# Patient Record
Sex: FEMALE | Race: WHITE | Employment: OTHER | URBAN - METROPOLITAN AREA
[De-identification: names, ages, dates, MRNs, and addresses within clinical notes are randomized per-mention and may not be internally consistent; named-entity substitution may affect disease eponyms.]

---

## 2017-01-04 RX ORDER — ESTRADIOL 1 MG/1
1 TABLET ORAL DAILY
Qty: 90 TAB | Refills: 3 | Status: SHIPPED | OUTPATIENT
Start: 2017-01-04 | End: 2017-02-15 | Stop reason: SDUPTHER

## 2017-01-06 RX ORDER — BUPROPION HYDROCHLORIDE 150 MG/1
TABLET, EXTENDED RELEASE ORAL
Qty: 270 TAB | Refills: 3 | Status: SHIPPED | OUTPATIENT
Start: 2017-01-06 | End: 2017-03-09 | Stop reason: ALTCHOICE

## 2017-02-13 RX ORDER — LOVASTATIN 40 MG/1
40 TABLET ORAL
Qty: 90 TAB | Refills: 3 | Status: SHIPPED | OUTPATIENT
Start: 2017-02-13 | End: 2017-03-09 | Stop reason: ALTCHOICE

## 2017-02-13 RX ORDER — ROSUVASTATIN CALCIUM 5 MG/1
5 TABLET, COATED ORAL
Qty: 30 TAB | Refills: 6 | Status: CANCELLED | OUTPATIENT
Start: 2017-02-13

## 2017-02-13 NOTE — TELEPHONE ENCOUNTER
From: Margret Lopez  To: Choco Broderick MD  Sent: 2/10/2017 8:00 PM EST  Subject: Medication Renewal Request    Original authorizing provider: MD Margret Washington would like a refill of the following medications:  rosuvastatin (CRESTOR) 5 mg tablet Choco Broderick MD]    Preferred pharmacy: [address] [place] [address] mail in pharmacy    Comment:  I have been taking atorvastatin for a few months because there was no cost for the rx. The generic Crestor 5 mg. cost the same as the brand name 20 mg. I now need a script for lovastatin 20 or 40 mg. It is the only statin with a 0 copay on my [employer] rx plan. The mail order pharmacy will email a request to you. I would like a 90 day supply. I stopped the generic Wellbutrin as it was too expensive. My next appt with you is March 9th.  Thanks

## 2017-02-16 RX ORDER — ESTRADIOL 1 MG/1
1 TABLET ORAL DAILY
Qty: 90 TAB | Refills: 3 | Status: SHIPPED | OUTPATIENT
Start: 2017-02-16 | End: 2017-03-09 | Stop reason: SDUPTHER

## 2017-02-16 RX ORDER — CYCLOBENZAPRINE HCL 10 MG
10 TABLET ORAL 2 TIMES DAILY
Qty: 180 TAB | Refills: 1 | Status: SHIPPED | OUTPATIENT
Start: 2017-02-16 | End: 2017-03-09 | Stop reason: SDUPTHER

## 2017-02-16 RX ORDER — DICLOFENAC SODIUM 75 MG/1
TABLET, DELAYED RELEASE ORAL
Qty: 180 TAB | Refills: 1 | Status: SHIPPED | OUTPATIENT
Start: 2017-02-16 | End: 2017-03-09 | Stop reason: SDUPTHER

## 2017-03-07 ENCOUNTER — HOSPITAL ENCOUNTER (OUTPATIENT)
Dept: LAB | Age: 72
Discharge: HOME | End: 2017-03-07
Payer: MEDICARE

## 2017-03-07 PROCEDURE — 36415 COLL VENOUS BLD VENIPUNCTURE: CPT

## 2017-03-07 PROCEDURE — 80053 COMPREHEN METABOLIC PANEL: CPT

## 2017-03-07 PROCEDURE — 80061 LIPID PANEL: CPT

## 2017-03-08 DIAGNOSIS — E78.00 PURE HYPERCHOLESTEROLEMIA: ICD-10-CM

## 2017-03-08 LAB
ALBUMIN SERPL-MCNC: 4 G/DL (ref 3.6–4.8)
ALBUMIN/GLOB SERPL: 1.6 {RATIO} (ref 1.1–2.5)
ALP SERPL-CCNC: 65 IU/L (ref 39–117)
ALT SERPL-CCNC: 23 IU/L (ref 0–32)
AST SERPL-CCNC: 29 IU/L (ref 0–40)
BILIRUB SERPL-MCNC: 0.5 MG/DL (ref 0–1.2)
BUN SERPL-MCNC: 14 MG/DL (ref 8–27)
BUN/CREAT SERPL: 20 (ref 11–26)
CALCIUM SERPL-MCNC: 9.5 MG/DL (ref 8.7–10.3)
CHLORIDE SERPL-SCNC: 100 MMOL/L (ref 96–106)
CHOLEST SERPL-MCNC: 227 MG/DL (ref 100–199)
CO2 SERPL-SCNC: 27 MMOL/L (ref 18–29)
CREAT SERPL-MCNC: 0.69 MG/DL (ref 0.57–1)
GLOBULIN SER CALC-MCNC: 2.5 G/DL (ref 1.5–4.5)
GLUCOSE SERPL-MCNC: 85 MG/DL (ref 65–99)
HDLC SERPL-MCNC: 56 MG/DL
INTERPRETATION, 910389: NORMAL
LDLC SERPL CALC-MCNC: 109 MG/DL (ref 0–99)
POTASSIUM SERPL-SCNC: 4.3 MMOL/L (ref 3.5–5.2)
PROT SERPL-MCNC: 6.5 G/DL (ref 6–8.5)
SODIUM SERPL-SCNC: 140 MMOL/L (ref 134–144)
TRIGL SERPL-MCNC: 309 MG/DL (ref 0–149)
VLDLC SERPL CALC-MCNC: 62 MG/DL (ref 5–40)

## 2017-03-09 ENCOUNTER — OFFICE VISIT (OUTPATIENT)
Dept: INTERNAL MEDICINE CLINIC | Age: 72
End: 2017-03-09

## 2017-03-09 VITALS
HEART RATE: 89 BPM | DIASTOLIC BLOOD PRESSURE: 84 MMHG | HEIGHT: 61 IN | OXYGEN SATURATION: 97 % | SYSTOLIC BLOOD PRESSURE: 143 MMHG | TEMPERATURE: 98.4 F | RESPIRATION RATE: 16 BRPM | WEIGHT: 175.6 LBS | BODY MASS INDEX: 33.15 KG/M2

## 2017-03-09 DIAGNOSIS — E78.00 PURE HYPERCHOLESTEROLEMIA: Primary | ICD-10-CM

## 2017-03-09 DIAGNOSIS — Z12.11 SCREEN FOR COLON CANCER: ICD-10-CM

## 2017-03-09 RX ORDER — CYCLOBENZAPRINE HCL 10 MG
10 TABLET ORAL 2 TIMES DAILY
Qty: 180 TAB | Refills: 1 | Status: SHIPPED | OUTPATIENT
Start: 2017-03-09 | End: 2017-09-05 | Stop reason: SDUPTHER

## 2017-03-09 RX ORDER — DICLOFENAC SODIUM 75 MG/1
TABLET, DELAYED RELEASE ORAL
Qty: 180 TAB | Refills: 1 | Status: SHIPPED | OUTPATIENT
Start: 2017-03-09 | End: 2017-09-05 | Stop reason: SDUPTHER

## 2017-03-09 RX ORDER — ALPRAZOLAM 0.5 MG/1
.25-.5 TABLET ORAL
Qty: 90 TAB | Refills: 0 | Status: SHIPPED | OUTPATIENT
Start: 2017-03-09

## 2017-03-09 RX ORDER — ATORVASTATIN CALCIUM 20 MG/1
20 TABLET, FILM COATED ORAL DAILY
Qty: 90 TAB | Refills: 3 | Status: SHIPPED | OUTPATIENT
Start: 2017-03-09 | End: 2017-12-26 | Stop reason: SDUPTHER

## 2017-03-09 RX ORDER — ESTRADIOL 1 MG/1
1 TABLET ORAL DAILY
Qty: 90 TAB | Refills: 3 | Status: SHIPPED | OUTPATIENT
Start: 2017-03-09 | End: 2018-03-14 | Stop reason: SDUPTHER

## 2017-03-09 NOTE — MR AVS SNAPSHOT
Visit Information Date & Time Provider Department Dept. Phone Encounter #  
 3/9/2017 11:20 AM Rosa Corrigan MD [facility] [phone] [id number] Follow-up Instructions Return in about 6 months (around 9/9/2017) for hyperlipidemia. Upcoming Health Maintenance Date Due COLONOSCOPY 11/3/1965 GLAUCOMA SCREENING Q2Y 6/10/2016 MEDICARE YEARLY EXAM 4/5/2017 Pneumococcal 65+ Low/Medium Risk (2 of 2 - PPSV23) 4/19/2017 BREAST CANCER SCRN MAMMOGRAM 1/14/2018 DTaP/Tdap/Td series (2 - Td) 6/27/2021 Allergies as of 3/9/2017  Review Complete On: 3/9/2017 By: Rosa Corrigan MD  
  
 Severity Noted Reaction Type Reactions Adhesive  08/14/2013    Rash RED AND PUFFY SKIN Celebrex [Celecoxib]  06/17/2011    Other (comments) INCREASED BP Lipitor [Atorvastatin]  05/20/2016    Myalgia Niaspan [Niacin]  06/17/2011    Other (comments) SEVERE REDNESS, BURNING OF ENTIRE BODY WITH CHILLS Current Immunizations  Reviewed on 3/9/2017 Name Date Influenza Vaccine 10/30/2015, 12/2/2014 Pneumococcal Conjugate (PCV-13) 4/19/2016 Tdap 6/27/2011 Zoster Vaccine, Live 12/27/2013 Reviewed by Adolfo Chang on 3/9/2017 at 12:19 PM  
You Were Diagnosed With   
  
 Codes Comments Pure hypercholesterolemia    -  Primary ICD-10-CM: E78.00 ICD-9-CM: 272.0 Screen for colon cancer     ICD-10-CM: Z12.11 ICD-9-CM: V76.51 Vitals BP Pulse Temp Resp Height(growth percentile) Weight(growth percentile) 143/84 (BP 1 Location: Left arm, BP Patient Position: Sitting) 89 98.4 °F (36.9 °C) (Oral) 16 5' 1\" (1.549 m) 175 lb 9.6 oz (79.7 kg) SpO2 BMI OB Status Smoking Status 97% 33.18 kg/m2 Hysterectomy Former Smoker BMI and BSA Data Body Mass Index Body Surface Area  
 33.18 kg/m 2 1.85 m 2 Preferred Pharmacy Pharmacy Name Phone [address] [facility] [address] [facility] [address] [phone] Your Updated Medication List  
  
   
This list is accurate as of: 3/9/17 12:38 PM.  Always use your most recent med list.  
  
  
  
  
 ALPRAZolam 0.5 mg tablet Commonly known as:  Aline Fell Take 0.5-1 Tabs by mouth two (2) times daily as needed for Anxiety. Max Daily Amount: 1 mg.  
  
 atorvastatin 20 mg tablet Commonly known as:  LIPITOR Take 1 Tab by mouth daily. calcium carbonate 500 mg calcium (1,250 mg) tablet Commonly known as:  OS-BRIGHT Take 1 Tab by mouth two (2) times a day. CENTRUM SILVER WOMEN PO Take 1 Tab by mouth daily. CLARITIN 10 mg tablet Generic drug:  loratadine Take 10 mg by mouth nightly. cyclobenzaprine 10 mg tablet Commonly known as:  FLEXERIL Take 1 Tab by mouth two (2) times a day. diclofenac EC 75 mg EC tablet Commonly known as:  VOLTAREN  
TAKE ONE TABLET BY MOUTH TWICE DAILY  
  
 estradiol 1 mg tablet Commonly known as:  ESTRACE Take 1 Tab by mouth daily. furosemide 40 mg tablet Commonly known as:  LASIX TAKE ONE-HALF TO ONE TABLET BY MOUTH ONCE DAILY AS NEEDED (SWELLING) OTHER(NON-FORMULARY) ( Glucosamine - Chondroitin ) - 1 tab PO BID  
  
 VITAMIN D3 1,000 unit tablet Generic drug:  cholecalciferol Take 1,000 Units by mouth two (2) times a day. Prescriptions Printed Refills ALPRAZolam (XANAX) 0.5 mg tablet 0 Sig: Take 0.5-1 Tabs by mouth two (2) times daily as needed for Anxiety. Max Daily Amount: 1 mg. Class: Print Route: Oral  
  
Prescriptions Sent to Pharmacy Refills  
 estradiol (ESTRACE) 1 mg tablet 3 Sig: Take 1 Tab by mouth daily. Class: Normal  
 Pharmacy: [address] [place] [address], [facility] Ph #: [phone] Route: Oral  
 diclofenac EC (VOLTAREN) 75 mg EC tablet 1 Sig: TAKE ONE TABLET BY MOUTH TWICE DAILY  Class: Normal  
 Pharmacy: [address] [place] [address], [address] [place] [address] Ph #: [phone]  
 cyclobenzaprine (FLEXERIL) 10 mg tablet 1 Sig: Take 1 Tab by mouth two (2) times a day. Class: Normal  
 Pharmacy: [address] [place] [address], [address] [place] [address] Ph #: [phone] Route: Oral  
 atorvastatin (LIPITOR) 20 mg tablet 3 Sig: Take 1 Tab by mouth daily. Class: Normal  
 Pharmacy: [address] [place] [address] Ph #: [phone] Route: Oral  
  
We Performed the Following OCCULT BLOOD, IMMUNOASSAY (FIT) [id number] CPT(R)] Follow-up Instructions Return in about 6 months (around 9/9/2017) for hyperlipidemia. Introducing [facility]! Dear Steven Congress: 
Thank you for requesting a PresentationTube account. Our records indicate that you already have an active PresentationTube account. You can access your account anytime at [email or web address] Did you know that you can access your hospital and ER discharge instructions at any time in PresentationTube? You can also review all of your test results from your hospital stay or ER visit. Additional Information If you have questions, please visit the Frequently Asked Questions section of the PresentationTube website at [email or web address] Remember, PresentationTube is NOT to be used for urgent needs. For medical emergencies, dial 911. Now available from your iPhone and Android! Please provide this summary of care documentation to your next provider. Your primary care clinician is listed as LISA ESQUIVEL. If you have any questions after today's visit, please call [phone].

## 2017-03-09 NOTE — PROGRESS NOTES
HISTORY OF PRESENT ILLNESS  Mireya Joshi is a 73 y.o. female. HPI  Moving to [facility], daughter and ex- live there. Needs additional help with the son who has Down Syndrome and now has dementia. Doing well. Triglycerides are still elevated. Not eating well. Crestor (generic) was too expensive. Wants to change back to Atorvastatin. Denieis chest pain or tightness,  Some myalgias but tolerable.    'increased anxiety. Has stopped wellbutrin. Depression has not been a problem. Situational driven. Has taken xanax in past after death of mother. Current Outpatient Prescriptions:     estradiol (ESTRACE) 1 mg tablet, Take 1 Tab by mouth daily. , Disp: 90 Tab, Rfl: 3    diclofenac EC (VOLTAREN) 75 mg EC tablet, TAKE ONE TABLET BY MOUTH TWICE DAILY, Disp: 180 Tab, Rfl: 1    cyclobenzaprine (FLEXERIL) 10 mg tablet, Take 1 Tab by mouth two (2) times a day., Disp: 180 Tab, Rfl: 1    atorvastatin (LIPITOR) 20 mg tablet, Take 1 Tab by mouth daily. , Disp: 90 Tab, Rfl: 3    ALPRAZolam (XANAX) 0.5 mg tablet, Take 0.5-1 Tabs by mouth two (2) times daily as needed for Anxiety. Max Daily Amount: 1 mg., Disp: 90 Tab, Rfl: 0    calcium carbonate (OS-BRIGHT) 500 mg calcium (1,250 mg) tablet, Take 1 Tab by mouth two (2) times a day., Disp: , Rfl:     OTHER,NON-FORMULARY,, ( Glucosamine - Chondroitin ) - 1 tab PO BID, Disp: , Rfl:     MULTIVITS-MIN/IRON/FA/LUTEIN (CENTRUM SILVER WOMEN PO), Take 1 Tab by mouth daily. , Disp: , Rfl:     furosemide (LASIX) 40 mg tablet, TAKE ONE-HALF TO ONE TABLET BY MOUTH ONCE DAILY AS NEEDED (SWELLING), Disp: 30 Tab, Rfl: 5    cholecalciferol (VITAMIN D3) 1,000 unit tablet, Take 1,000 Units by mouth two (2) times a day., Disp: , Rfl:     loratadine (CLARITIN) 10 mg tablet, Take 10 mg by mouth nightly., Disp: , Rfl:     Visit Vitals    /84 (BP 1 Location: Left arm, BP Patient Position: Sitting)    Pulse 89    Temp 98.4 °F (36.9 °C) (Oral)    Resp 16    Ht 5' 1\" (1.549 m)    Wt 175 lb 9.6 oz (79.7 kg)    SpO2 97%    BMI 33.18 kg/m2       Labs reviewed. ROS  See above  Physical Exam   Constitutional: She appears well-developed and well-nourished. HENT:   Head: Normocephalic and atraumatic. Neck: Neck supple. No thyromegaly present. Cardiovascular: Normal rate, regular rhythm and normal heart sounds. Pulmonary/Chest: Effort normal and breath sounds normal.   Musculoskeletal: She exhibits no edema. Lymphadenopathy:     She has no cervical adenopathy. Vitals reviewed. ASSESSMENT and PLAN  Hyperlipidemia - controlled although trig still elevated. Cont same medications. wontinue to work on diet and exercise. .  Labs prior to next visit  Situational anxiety - xanax prn. HM - declines colonoscopy secondary to prep. FIT testing cards given. Orders Placed This Encounter    OCCULT BLOOD, IMMUNOASSAY (FIT)    LIPID PANEL    HEPATIC FUNCTION PANEL    estradiol (ESTRACE) 1 mg tablet    diclofenac EC (VOLTAREN) 75 mg EC tablet    cyclobenzaprine (FLEXERIL) 10 mg tablet    atorvastatin (LIPITOR) 20 mg tablet    ALPRAZolam (XANAX) 0.5 mg tablet     Follow-up Disposition:  Return in about 6 months (around 9/9/2017) for hyperlipidemia.

## 2017-06-02 ENCOUNTER — PATIENT OUTREACH (OUTPATIENT)
Dept: INTERNAL MEDICINE CLINIC | Age: 72
End: 2017-06-02

## 2017-06-02 NOTE — PROGRESS NOTES
This note will not be viewable in [address] Patient is listed on daily census [employer] report for ED visit to [facility]  for GLF with toe fractures seen on XR of L foot. XR of lumbar spine, R & L knees, R elbow, R hand, & L ankle were negative for fracture. Patient was given Rxs for Percocet 5-325 mg 1/2-2 Tab Q4-6H prn and bacitracin 500 unit/GM apply BID. Instructed to f/u with ortho Dr. Mak Coles and suture removal in 8-10 days, apply ice for 20 min every 2 hours. The notes do not indicate where sutures were placed or where patient needs to apply ice. Contacted patient for SINTIA follow up. Introduced self and Nurse Navigator role. Verified patient's . She reports she is doing okay. C/o feeling sore, but no other concerns. She has a hx of surgery on the toes that were fractured and screws are loose in the great toe. She has 8 stitches in her knee. She will call Dr. Mak Coles to arrange follow up. She denies any needs from this office at this time. Encouraged her to call as needed. She was grateful for the call. Unable to complete Med Rec or further assessment because the patient was ready to end the call.

## 2017-07-10 ENCOUNTER — PATIENT OUTREACH (OUTPATIENT)
Dept: INTERNAL MEDICINE CLINIC | Age: 72
End: 2017-07-10

## 2017-07-10 NOTE — PROGRESS NOTES
Patient has completed 30 day transition of care. Patient declined CELIA HEALY appointment, as she stated plan to f/u with ortho Dr. Emerson Meadows. No other needs identified to Nurse Navigator at this time. Resolving episode.

## 2017-07-31 RX ORDER — SULFAMETHOXAZOLE AND TRIMETHOPRIM 800; 160 MG/1; MG/1
TABLET ORAL
Qty: 30 TAB | Refills: 0 | Status: SHIPPED | OUTPATIENT
Start: 2017-07-31 | End: 2018-03-14 | Stop reason: SDUPTHER

## 2017-09-05 RX ORDER — CYCLOBENZAPRINE HCL 10 MG
TABLET ORAL
Qty: 180 TAB | Refills: 1 | Status: SHIPPED | OUTPATIENT
Start: 2017-09-05 | End: 2018-03-14 | Stop reason: SDUPTHER

## 2017-09-05 RX ORDER — DICLOFENAC SODIUM 75 MG/1
TABLET, DELAYED RELEASE ORAL
Qty: 180 TAB | Refills: 1 | Status: SHIPPED | OUTPATIENT
Start: 2017-09-05 | End: 2018-03-14 | Stop reason: ALTCHOICE

## 2017-09-09 DIAGNOSIS — E78.00 PURE HYPERCHOLESTEROLEMIA: ICD-10-CM

## 2017-09-11 ENCOUNTER — HOSPITAL ENCOUNTER (OUTPATIENT)
Dept: LAB | Age: 72
Discharge: HOME | End: 2017-09-11
Payer: MEDICARE

## 2017-09-11 PROCEDURE — 80061 LIPID PANEL: CPT

## 2017-09-11 PROCEDURE — 36415 COLL VENOUS BLD VENIPUNCTURE: CPT

## 2017-09-11 PROCEDURE — 80076 HEPATIC FUNCTION PANEL: CPT

## 2017-09-12 LAB
ALBUMIN SERPL-MCNC: 4.1 G/DL (ref 3.6–4.8)
ALP SERPL-CCNC: 67 IU/L (ref 39–117)
ALT SERPL-CCNC: 21 IU/L (ref 0–32)
AST SERPL-CCNC: 29 IU/L (ref 0–40)
BILIRUB DIRECT SERPL-MCNC: 0.15 MG/DL (ref 0–0.4)
BILIRUB SERPL-MCNC: 0.5 MG/DL (ref 0–1.2)
CHOLEST SERPL-MCNC: 215 MG/DL (ref 100–199)
HDLC SERPL-MCNC: 73 MG/DL
INTERPRETATION, 910389: NORMAL
LDLC SERPL CALC-MCNC: 103 MG/DL (ref 0–99)
PROT SERPL-MCNC: 6.6 G/DL (ref 6–8.5)
TRIGL SERPL-MCNC: 196 MG/DL (ref 0–149)
VLDLC SERPL CALC-MCNC: 39 MG/DL (ref 5–40)

## 2017-09-15 ENCOUNTER — OFFICE VISIT (OUTPATIENT)
Dept: INTERNAL MEDICINE CLINIC | Age: 72
End: 2017-09-15

## 2017-09-15 VITALS
DIASTOLIC BLOOD PRESSURE: 80 MMHG | HEIGHT: 61 IN | HEART RATE: 76 BPM | BODY MASS INDEX: 33.04 KG/M2 | SYSTOLIC BLOOD PRESSURE: 146 MMHG | OXYGEN SATURATION: 98 % | WEIGHT: 175 LBS

## 2017-09-15 DIAGNOSIS — Z78.9 ADVANCE DIRECTIVE IN CHART: ICD-10-CM

## 2017-09-15 DIAGNOSIS — E78.00 PURE HYPERCHOLESTEROLEMIA: Primary | ICD-10-CM

## 2017-09-15 DIAGNOSIS — Z00.00 MEDICARE ANNUAL WELLNESS VISIT, SUBSEQUENT: ICD-10-CM

## 2017-09-15 NOTE — PROGRESS NOTES
patient is here for a routine check with no additional halley concerns.        Chief Complaint   Patient presents with    Cholesterol Problem     f/u

## 2017-09-15 NOTE — PATIENT INSTRUCTIONS

## 2017-09-15 NOTE — PROGRESS NOTES
Subjective: Samantha Malone is a 73 y.o. female who is seen for follow up of hyperlipidemia. Cardiovascular risk analysis - 73 y.o. female LDL goal is under 130. Compliance with treatment thus far has been good. ROS: taking medications as instructed, no medication side effects noted, no TIA's, no chest pain on exertion, no dyspnea on exertion, noting swelling of ankles, no orthostatic dizziness or lightheadedness, no palpitations. New concerns:   Left leg swelling. 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 Feel on knees. Fell x 2 since last visit. Fractured elbow during 1 fall. No pain but cannot fully extend left arm. Current Outpatient Prescriptions   Medication Sig Dispense Refill    cyclobenzaprine (FLEXERIL) 10 mg tablet TAKE 1 TABLET TWICE DAILY (SUBSTITUTED FOR  FLEXERIL) 180 Tab 1    diclofenac EC (VOLTAREN) 75 mg EC tablet TAKE 1 TABLET TWICE DAILY 180 Tab 1    trimethoprim-sulfamethoxazole (BACTRIM DS, SEPTRA DS) 160-800 mg per tablet TAKE ONE TABLET BY MOUTH ONCE DAILY AS NEEDED 30 Tab 0    estradiol (ESTRACE) 1 mg tablet Take 1 Tab by mouth daily. 90 Tab 3    atorvastatin (LIPITOR) 20 mg tablet Take 1 Tab by mouth daily. 90 Tab 3    ALPRAZolam (XANAX) 0.5 mg tablet Take 0.5-1 Tabs by mouth two (2) times daily as needed for Anxiety. Max Daily Amount: 1 mg. 90 Tab 0    MULTIVITS-MIN/IRON/FA/LUTEIN (CENTRUM SILVER WOMEN PO) Take 1 Tab by mouth daily.  furosemide (LASIX) 40 mg tablet TAKE ONE-HALF TO ONE TABLET BY MOUTH ONCE DAILY AS NEEDED (SWELLING) 30 Tab 5    cholecalciferol (VITAMIN D3) 1,000 unit tablet Take 1,000 Units by mouth two (2) times a day.         Lab Results   Component Value Date/Time    Cholesterol, total 215 09/11/2017 12:28 PM    HDL Cholesterol 73 09/11/2017 12:28 PM    LDL, calculated 103 09/11/2017 12:28 PM    Triglyceride 196 09/11/2017 12:28 PM     Lab Results   Component Value Date/Time    ALT (SGPT) 21 09/11/2017 12:28 PM    AST (SGOT) 29 09/11/2017 12:28 PM    Alk. phosphatase 67 09/11/2017 12:28 PM    Bilirubin, direct 0.15 09/11/2017 12:28 PM    Bilirubin, total 0.5 09/11/2017 12:28 PM    Albumin 4.1 09/11/2017 12:28 PM    Protein, total 6.6 09/11/2017 12:28 PM    INR 1.0 08/14/2013 10:57 AM    Prothrombin time 10.3 08/14/2013 10:57 AM    PLATELET 852 56/34/5588 12:55 PM    Hepatitis B surface Ag 0.12 08/28/2013 03:00 PM              Objective:   Visit Vitals    /80 (BP 1 Location: Right arm, BP Patient Position: Sitting)    Pulse 76    Ht 5' 1\" (1.549 m)    Wt 175 lb (79.4 kg)    SpO2 98%    BMI 33.07 kg/m2      Appearance: alert, well appearing, and in no distress. CVS exam   NECK: supple, no lad, no bruit, no tm  LUNGS: cta bilat  CV rrr, no m/g/r  ABD: soft, nt, nd, nabs  EXT: no c/c. Left leg with 1+ edema. No calf tenderness, neg homans and no cording. Trace swelling right leg.  bilat knee crepitance with decreased rom. .  Lab review: labs are reviewed, up to date and cholesterol is well controlled. Assessment/Plan:   Hyperlipidemia well controlled. current treatment plan is effective, no change in therapy. LE Edema- secondary to left knee OA - elevate leg. Discussed compression hose but pt unable to pull on. Orders Placed This Encounter    pneumococcal 23-merna ps vaccine (PNEUMOVAX 23) 25 mcg/0.5 mL injection     Follow-up Disposition: Not on File. This is a Subsequent Medicare Annual Wellness Exam (AWV) (Performed 12 months after IPPE or effective date of Medicare Part B enrollment, Once in a lifetime)    I have reviewed the patient's medical history in detail and updated the computerized patient record.      History     Past Medical History:   Diagnosis Date    Anxiety and depression     Calculus     AT AGE 18; NO RECURRENCE    Cataracts, bilateral     Chronic pain     SINCE AGE 20-LOWER BACK INJ.-SKIING ACCIDENT    DJD (degenerative joint disease)     MULTIPLE SITES-KNEES,GREAT TOES,SPINE    H/O scarlet fever     Hypercholesterolemia     Multinodular goiter     Nausea & vomiting     /P HYSTERECTOMY 1980s    Other ill-defined conditions     R LOWER LEG ANTERIORLY-NO SENSATION HOT/COL,SHARP/DULL    Pain 5/4/10    ADMITTED FOR INTRACTABLE PAIN--RIGHT SHIN NUMB EVER SINCE    Unspecified adverse effect of anesthesia     NORMAL DOSE VERSED INEFFECTIVE    UTI (lower urinary tract infection)     NONE IN A YEAR; H/O FREQUENT UTIs (4-5 A YEAR)-MACRODANTIN /P HAVING SEX      Past Surgical History:   Procedure Laterality Date    ENDOSCOPY, COLON, DIAGNOSTIC  2/2008    polyp ([place])-f/u 5 yrs.  HX CERVICAL FUSION      HX GI  1993    ENDOSCOPY    HX HEENT  1998    BILAT UPPER & LOWER BLEPHOROPLASTY    HX HYSTERECTOMY  4/1980    WITH ANTERIOR REPAIR    HX ORTHOPAEDIC  X3  LATE 1980s    BASILAR THUMB JOINT REPLACEMENT    HX ORTHOPAEDIC  1995    ORIF L WRIST FRACTURE    HX ORTHOPAEDIC  1995    L WRIST REMOVAL PLATE & SCREWS    HX ORTHOPAEDIC  2006    EXC SRTHRITIC NODULES R GREAT & 2ND TOES    HX ORTHOPAEDIC  2006    BUNIONECTOMY RIGHT    HX ORTHOPAEDIC  07/12    C 4-6 FUSION    HX ORTHOPAEDIC  1978    CARPAL TUNNEL MANAN    HX ORTHOPAEDIC  10/12    LEFT BUNIONECTOMY, GREAT TOE, HAMMERTOE CORRECTION,    HX ORTHOPAEDIC      CORRECTION OF LEFT 3RD TOE AND NEUROMA REMOVAL    HX OTHER SURGICAL  3/2013    Correction of left 3rd toe.  HX TONSILLECTOMY  1952    HX UROLOGICAL  7/09    BLADDER SLING     Current Outpatient Prescriptions   Medication Sig Dispense Refill    cyclobenzaprine (FLEXERIL) 10 mg tablet TAKE 1 TABLET TWICE DAILY (SUBSTITUTED FOR  FLEXERIL) 180 Tab 1    diclofenac EC (VOLTAREN) 75 mg EC tablet TAKE 1 TABLET TWICE DAILY 180 Tab 1    trimethoprim-sulfamethoxazole (BACTRIM DS, SEPTRA DS) 160-800 mg per tablet TAKE ONE TABLET BY MOUTH ONCE DAILY AS NEEDED 30 Tab 0    estradiol (ESTRACE) 1 mg tablet Take 1 Tab by mouth daily.  90 Tab 3    atorvastatin (LIPITOR) 20 mg tablet Take 1 Tab by mouth daily. 90 Tab 3    ALPRAZolam (XANAX) 0.5 mg tablet Take 0.5-1 Tabs by mouth two (2) times daily as needed for Anxiety. Max Daily Amount: 1 mg. 90 Tab 0    MULTIVITS-MIN/IRON/FA/LUTEIN (CENTRUM SILVER WOMEN PO) Take 1 Tab by mouth daily.  furosemide (LASIX) 40 mg tablet TAKE ONE-HALF TO ONE TABLET BY MOUTH ONCE DAILY AS NEEDED (SWELLING) 30 Tab 5    cholecalciferol (VITAMIN D3) 1,000 unit tablet Take 1,000 Units by mouth two (2) times a day.        Allergies   Allergen Reactions    Adhesive Rash     RED AND PUFFY SKIN    Celebrex [Celecoxib] Other (comments)     INCREASED BP    Lipitor [Atorvastatin] Myalgia    Niaspan [Niacin] Other (comments)     SEVERE REDNESS, BURNING OF ENTIRE BODY WITH CHILLS     Family History   Problem Relation Age of Onset    Post-op Nausea/Vomiting Mother     Diabetes Mother     Coronary Artery Disease Mother     High Cholesterol Mother     Hypertension Mother     Heart Disease Mother      CARDIOMYOPATHY, CHF    Cancer Father      oat cell of lung    Alcohol abuse Father     Hypertension Sister     Hypertension Brother     High Cholesterol Brother     Other Sister      Sjogren's    Arthritis-osteo Sister     Cancer Maternal Uncle      COLON    Cancer Paternal Uncle     Cancer Paternal Uncle      COLON    Liver Disease Son    Luane Parrot Syndrome Son     Cancer Paternal Aunt      BLADDER     Social History   Substance Use Topics    Smoking status: Former Smoker     Packs/day: 3.00     Years: 20.00     Quit date: 6/17/1983    Smokeless tobacco: Never Used      Comment: STARTED SMOKING AT AGE 11/ 1PPD BY AGE 14    Alcohol use 0.0 - 0.6 oz/week     0 - 1 Standard drinks or equivalent per week      Comment: SOCIALLY     Patient Active Problem List   Diagnosis Code    Cervical spinal stenosis M48.02    Severe back pain M54.9    Abnormal urine odor R82.90    Hyperlipidemia E78.5    Depression F32.9    Recurrent UTI N39.0    Copy of advanced directive obtained Z78.9    Goiter, nodular E04.9    Vitamin D deficiency E55.9       Depression Risk Factor Screening:     PHQ over the last two weeks 6/30/2014   PHQ Not Done -   Little interest or pleasure in doing things Not at all   Feeling down, depressed or hopeless Not at all   Total Score PHQ 2 0     Alcohol Risk Factor Screening: You do not drink alcohol or very rarely. Functional Ability and Level of Safety:   Hearing Loss  Hearing is good. Activities of Daily Living  The home contains: no safety equipment  Patient does total self care    Fall Risk  Fall Risk Assessment, last 12 mths 9/15/2017   Able to walk? Yes   Fall in past 12 months? Yes   Fall with injury? Yes   Number of falls in past 12 months 2   Fall Risk Score 3       Abuse Screen  Patient is not abused    Cognitive Screening   Evaluation of Cognitive Function:  Has your family/caregiver stated any concerns about your memory: no  Normal    Patient Care Team   Patient Care Team:  Parish Edwards MD as PCP - General (Internal Medicine)  Burl Sever, MD (Orthopedic Surgery)  Omid Esparza MD (Ophthalmology)  Shanita Munoz RN as Ambulatory Care Navigator (Internal Medicine)    Assessment/Plan   Education and counseling provided:  Are appropriate based on today's review and evaluation   Advanced directive on file    Diagnoses and all orders for this visit:    1. Pure hypercholesterolemia    2. Medicare annual wellness visit, subsequent    3. Advance directive in chart    Other orders  -     pneumococcal 23-merna ps vaccine (PNEUMOVAX 23) 25 mcg/0.5 mL injection; 0.5 mL by IntraMUSCular route once for 1 dose.       Health Maintenance Due   Topic Date Due    COLONOSCOPY  11/03/1965    MEDICARE YEARLY EXAM  09/15/2018    Pneumococcal 65+ Low/Medium Risk (2 of 2 - PPSV23) Ordered today    INFLUENZA AGE 9 TO ADULT  08/01/2017

## 2017-09-15 NOTE — MR AVS SNAPSHOT
Visit Information Date & Time Provider Department Dept. Phone Encounter #  
 9/15/2017 12:00 PM Anna Brush MD [facility] [phone] [id number] Your Appointments 3/14/2018 11:20 AM  
ROUTINE CARE with Anna Brush MD  
[facility] [address]) Appt Note: 6 month f/up [place] [address] [place] [id number] [place] [address] [address]. [address] [facility] [address] Upcoming Health Maintenance Date Due COLONOSCOPY 11/3/1963 MEDICARE YEARLY EXAM 4/5/2017 Pneumococcal 65+ Low/Medium Risk (2 of 2 - PPSV23) 4/19/2017 INFLUENZA AGE 9 TO ADULT 8/1/2017 BREAST CANCER SCRN MAMMOGRAM 1/14/2018 GLAUCOMA SCREENING Q2Y 9/2/2018 DTaP/Tdap/Td series (2 - Td) 6/27/2021 Allergies as of 9/15/2017  Review Complete On: 9/15/2017 By: Anna Brush MD  
  
 Severity Noted Reaction Type Reactions Adhesive  08/14/2013    Rash RED AND PUFFY SKIN Celebrex [Celecoxib]  06/17/2011    Other (comments) INCREASED BP Lipitor [Atorvastatin]  05/20/2016    Myalgia Niaspan [Niacin]  06/17/2011    Other (comments) SEVERE REDNESS, BURNING OF ENTIRE BODY WITH CHILLS Current Immunizations  Reviewed on 3/9/2017 Name Date Influenza Vaccine 10/30/2015, 12/2/2014 Pneumococcal Conjugate (PCV-13) 4/19/2016 Tdap 6/27/2011 Zoster Vaccine, Live 12/27/2013 Not reviewed this visit You Were Diagnosed With   
  
 Codes Comments Pure hypercholesterolemia    -  Primary ICD-10-CM: E78.00 ICD-9-CM: 272.0 Medicare annual wellness visit, subsequent     ICD-10-CM: Z00.00 ICD-9-CM: V70.0 Advance directive in chart     ICD-10-CM: Z78.9 ICD-9-CM: V49.89 Vitals  BP Pulse Height(growth percentile) Weight(growth percentile) SpO2 BMI  
 146/80 (BP 1 Location: Right arm, BP Patient Position: Sitting) 76 5' 1\" (1.549 m) 175 lb (79.4 kg) 98% 33.07 kg/m2 OB Status Smoking Status Hysterectomy Former Smoker Vitals History BMI and BSA Data Body Mass Index Body Surface Area 33.07 kg/m 2 1.85 m 2 Preferred Pharmacy Pharmacy Name Phone [facility] - [address] [facility] [address] [place] [address] [phone] Your Updated Medication List  
  
   
This list is accurate as of: 9/15/17  1:14 PM.  Always use your most recent med list.  
  
  
  
  
 ALPRAZolam 0.5 mg tablet Commonly known as:  Will Timo Take 0.5-1 Tabs by mouth two (2) times daily as needed for Anxiety. Max Daily Amount: 1 mg.  
  
 atorvastatin 20 mg tablet Commonly known as:  LIPITOR Take 1 Tab by mouth daily. CENTRUM SILVER WOMEN PO Take 1 Tab by mouth daily. cyclobenzaprine 10 mg tablet Commonly known as:  FLEXERIL  
TAKE 1 TABLET TWICE DAILY (SUBSTITUTED FOR  FLEXERIL) diclofenac EC 75 mg EC tablet Commonly known as:  VOLTAREN  
TAKE 1 TABLET TWICE DAILY  
  
 estradiol 1 mg tablet Commonly known as:  ESTRACE Take 1 Tab by mouth daily. furosemide 40 mg tablet Commonly known as:  LASIX TAKE ONE-HALF TO ONE TABLET BY MOUTH ONCE DAILY AS NEEDED (SWELLING)  
  
 pneumococcal 23-merna ps vaccine 25 mcg/0.5 mL injection Commonly known as:  PNEUMOVAX 23  
0.5 mL by IntraMUSCular route once for 1 dose. trimethoprim-sulfamethoxazole 160-800 mg per tablet Commonly known as:  BACTRIM DS, SEPTRA DS  
TAKE ONE TABLET BY MOUTH ONCE DAILY AS NEEDED  
  
 VITAMIN D3 1,000 unit tablet Generic drug:  cholecalciferol Take 1,000 Units by mouth two (2) times a day. Prescriptions Printed Refills  
 pneumococcal 23-merna ps vaccine (PNEUMOVAX 23) 25 mcg/0.5 mL injection 0 Si.5 mL by IntraMUSCular route once for 1 dose. Class: Print Route: IntraMUSCular Patient Instructions Medicare Wellness Visit, Female The best way to live healthy is to have a healthy lifestyle by eating a well-balanced diet, exercising regularly, limiting alcohol and stopping smoking. Regular physical exams and screening tests are another way to keep healthy. Preventive exams provided by your health care provider can find health problems before they become diseases or illnesses. Preventive services including immunizations, screening tests, monitoring and exams can help you take care of your own health. All people over age 72 should have a pneumovax  and and a prevnar shot to prevent pneumonia. These are once in a lifetime unless you and your provider decide differently. All people over 65 should have a yearly flu shot and a tetanus vaccine every 10 years. A bone mass density to screen for osteoporosis or thinning of the bones should be done every 2 years after 65. Screening for diabetes mellitus with a blood sugar test should be done every year. Glaucoma is a disease of the eye due to increased ocular pressure that can lead to blindness and it should be done every year by an eye professional. 
 
Cardiovascular screening tests that check for elevated lipids (fatty part of blood) which can lead to heart disease and strokes should be done every 5 years. Colorectal screening that evaluates for blood or polyps in your colon should be done yearly as a stool test or every five years as a flexible sigmoidoscope or every 10 years as a colonoscopy up to age 76. Breast cancer screening with a mammogram is recommended biennially  for women age 54-69. Screening for cervical cancer with a pap smear and pelvic exam is recommended for women after age 72 years every 2 years up to age 79 or when the provider and patient decide to stop. If there is a history of cervical abnormalities or other increased risk for cancer then the test is recommended yearly.  
 
Hepatitis C screening is also recommended for anyone born between [birth year] through 1965. A shingles vaccine is also recommended once in a lifetime after age 61. Your Medicare Wellness Exam is recommended annually. Here is a list of your current Health Maintenance items with a due date: 
Health Maintenance Due Topic Date Due  
 Colonoscopy  11/03/1963 [facility] Annual Well Visit  09/15/2018  Pneumococcal Vaccine (2 of 2 - PPSV23) Prescription given  Flu Vaccine  08/01/2017 Introducing [facility]! Dear Nico Valles: 
Thank you for requesting a Handmark account. Our records indicate that you already have an active Handmark account. You can access your account anytime at [email or web address] Did you know that you can access your hospital and ER discharge instructions at any time in Handmark? You can also review all of your test results from your hospital stay or ER visit. Additional Information If you have questions, please visit the Frequently Asked Questions section of the Handmark website at [email or web address] Remember, Handmark is NOT to be used for urgent needs. For medical emergencies, dial 911. Now available from your iPhone and Android! Please provide this summary of care documentation to your next provider. Your primary care clinician is listed as LISA ESQUIVEL. If you have any questions after today's visit, please call [phone].

## 2017-12-27 RX ORDER — ATORVASTATIN CALCIUM 20 MG/1
TABLET, FILM COATED ORAL
Qty: 90 TAB | Refills: 3 | Status: SHIPPED | OUTPATIENT
Start: 2017-12-27 | End: 2019-03-21 | Stop reason: SDUPTHER

## 2018-03-14 ENCOUNTER — HOSPITAL ENCOUNTER (OUTPATIENT)
Dept: LAB | Age: 73
Discharge: HOME | End: 2018-03-14
Payer: MEDICARE

## 2018-03-14 ENCOUNTER — OFFICE VISIT (OUTPATIENT)
Dept: INTERNAL MEDICINE CLINIC | Age: 73
End: 2018-03-14

## 2018-03-14 VITALS
TEMPERATURE: 99 F | DIASTOLIC BLOOD PRESSURE: 64 MMHG | SYSTOLIC BLOOD PRESSURE: 140 MMHG | WEIGHT: 175 LBS | HEIGHT: 61 IN | RESPIRATION RATE: 17 BRPM | OXYGEN SATURATION: 97 % | HEART RATE: 83 BPM | BODY MASS INDEX: 33.04 KG/M2

## 2018-03-14 DIAGNOSIS — Z12.39 SCREENING FOR BREAST CANCER: ICD-10-CM

## 2018-03-14 DIAGNOSIS — M15.9 PRIMARY OSTEOARTHRITIS INVOLVING MULTIPLE JOINTS: ICD-10-CM

## 2018-03-14 DIAGNOSIS — Z78.0 POSTMENOPAUSAL: ICD-10-CM

## 2018-03-14 DIAGNOSIS — N39.0 RECURRENT UTI: ICD-10-CM

## 2018-03-14 DIAGNOSIS — E78.00 PURE HYPERCHOLESTEROLEMIA: Primary | ICD-10-CM

## 2018-03-14 DIAGNOSIS — Z12.11 SCREEN FOR COLON CANCER: ICD-10-CM

## 2018-03-14 PROCEDURE — 36415 COLL VENOUS BLD VENIPUNCTURE: CPT

## 2018-03-14 PROCEDURE — 80053 COMPREHEN METABOLIC PANEL: CPT

## 2018-03-14 PROCEDURE — 80061 LIPID PANEL: CPT

## 2018-03-14 RX ORDER — CYCLOBENZAPRINE HCL 10 MG
TABLET ORAL
Qty: 90 TAB | Refills: 1 | Status: SHIPPED | OUTPATIENT
Start: 2018-03-14 | End: 2018-07-27 | Stop reason: ALTCHOICE

## 2018-03-14 RX ORDER — SULFAMETHOXAZOLE AND TRIMETHOPRIM 800; 160 MG/1; MG/1
1 TABLET ORAL 2 TIMES DAILY
Qty: 30 TAB | Refills: 1 | Status: SHIPPED | OUTPATIENT
Start: 2018-03-14 | End: 2018-07-27 | Stop reason: ALTCHOICE

## 2018-03-14 RX ORDER — DICLOFENAC SODIUM 75 MG/1
TABLET, DELAYED RELEASE ORAL
Qty: 180 TAB | Refills: 1 | Status: CANCELLED | OUTPATIENT
Start: 2018-03-14

## 2018-03-14 RX ORDER — MELOXICAM 15 MG/1
15 TABLET ORAL DAILY
Qty: 90 TAB | Refills: 1 | Status: SHIPPED | OUTPATIENT
Start: 2018-03-14 | End: 2018-08-01 | Stop reason: SDUPTHER

## 2018-03-14 RX ORDER — ESTRADIOL 1 MG/1
0.5 TABLET ORAL DAILY
Qty: 45 TAB | Refills: 3 | Status: SHIPPED | OUTPATIENT
Start: 2018-03-14 | End: 2019-07-29 | Stop reason: DRUGHIGH

## 2018-03-14 NOTE — PROGRESS NOTES
Subjective: Aby Burnett is a 81 y.o. female who is seen for follow up of hyperlipidemia. Cardiovascular risk analysis - 81 y.o. female LDL goal is under 130. Compliance with treatment thus far has been good. ROS: taking medications as instructed, no medication side effects noted, no TIA's, no chest pain on exertion, no dyspnea on exertion, no swelling of ankles, no orthostatic dizziness or lightheadedness, no palpitations. New concerns:   Cut back on the flexeril to once a day. Still having pain but feels more joint issues. Diclofenac is not as effective as it once was. Mostly her knees - will be injected again in 2 weeks. Having increased pain in left shoulder. Also with feet pain. Cut back on estradiol to 0.5mg daily. Had 1 short hot flash vs dizzy spell a couple of nights ago but resolved after 30 minutes. Moved to [facility]. Closer to son's father which is helping. Struggling to get services and  in [place] [facility] [place]. Skin issue - .areas that look like keratosis. Uses otc antifungal which is helping. Blood pressure has been normal at home. Drove 3 hours for her appointment her today. Tends to run 130/80    Current Outpatient Prescriptions   Medication Sig Dispense Refill    trimethoprim-sulfamethoxazole (BACTRIM DS, SEPTRA DS) 160-800 mg per tablet Take 1 Tab by mouth two (2) times a day. For 3 days. As needed for uti 30 Tab 1    cyclobenzaprine (FLEXERIL) 10 mg tablet TAKE 1 TABLET ONCE A DAILY (SUBSTITUTED FOR  FLEXERIL) 90 Tab 1    estradiol (ESTRACE) 1 mg tablet Take 0.5 Tabs by mouth daily. 45 Tab 3    meloxicam (MOBIC) 15 mg tablet Take 1 Tab by mouth daily. Replaced diclofenac 90 Tab 1    atorvastatin (LIPITOR) 20 mg tablet TAKE 1 TABLET EVERY DAY 90 Tab 3    MULTIVITS-MIN/IRON/FA/LUTEIN (CENTRUM SILVER WOMEN PO) Take 1 Tab by mouth daily.       furosemide (LASIX) 40 mg tablet TAKE ONE-HALF TO ONE TABLET BY MOUTH ONCE DAILY AS NEEDED (SWELLING) (Patient taking differently: TAKE ONE TABLET BY MOUTH ONCE DAILY AS NEEDED (SWELLING)) 30 Tab 5    cholecalciferol (VITAMIN D3) 1,000 unit tablet Take 1,000 Units by mouth two (2) times a day.  ALPRAZolam (XANAX) 0.5 mg tablet Take 0.5-1 Tabs by mouth two (2) times daily as needed for Anxiety. Max Daily Amount: 1 mg. 90 Tab 0      Lab Results   Component Value Date/Time    Cholesterol, total 215 (H) 09/11/2017 12:28 PM    HDL Cholesterol 73 09/11/2017 12:28 PM    LDL, calculated 103 (H) 09/11/2017 12:28 PM    Triglyceride 196 (H) 09/11/2017 12:28 PM     Lab Results   Component Value Date/Time    ALT (SGPT) 21 09/11/2017 12:28 PM    AST (SGOT) 29 09/11/2017 12:28 PM    Alk. phosphatase 67 09/11/2017 12:28 PM    Bilirubin, direct 0.15 09/11/2017 12:28 PM    Bilirubin, total 0.5 09/11/2017 12:28 PM    Albumin 4.1 09/11/2017 12:28 PM    Protein, total 6.6 09/11/2017 12:28 PM    INR 1.0 08/14/2013 10:57 AM    Prothrombin time 10.3 08/14/2013 10:57 AM    PLATELET 606 32/24/6401 12:55 PM    Hepatitis B surface Ag 0.12 08/28/2013 03:00 PM          Objective:     Visit Vitals    /64    Pulse 83    Temp 99 °F (37.2 °C) (Oral)    Resp 17    Ht 5' 1\" (1.549 m)    Wt 175 lb (79.4 kg)    SpO2 97%    BMI 33.07 kg/m2      Appearance: alert, well appearing, and in no distress and oriented to person, place, and time. CVS exam   .NECK: supple, no lad, no bruit, no tm  LUNGS: cta bilat  CV rrr, no m/g/r  ABD: soft, nt, nd, nabs  EXT: no c/c/e      Assessment/Plan:   Hyperlipidemia well controlled. current treatment plan is effective, no change in therapy. Osteoarthritis - trial mobic instead of diclofenac. Continue lower dose of flexeril. Recurrent UTI - taking bactrim at home prn. Continue same. Menopausal symptoms - continue lower dose estradiol as tolerating well.       Hm - mammogram and DXA, FIT testing   Orders Placed This Encounter    RISHI MAMMO BI SCREENING INCL CAD    DEXA BONE DENSITY STUDY AXIAL    OCCULT BLOOD, IMMUNOASSAY (FIT)    METABOLIC PANEL, COMPREHENSIVE    LIPID PANEL    trimethoprim-sulfamethoxazole (BACTRIM DS, SEPTRA DS) 160-800 mg per tablet    cyclobenzaprine (FLEXERIL) 10 mg tablet    estradiol (ESTRACE) 1 mg tablet    meloxicam (MOBIC) 15 mg tablet     Follow-up Disposition:  Return in about 6 months (around 9/14/2018) for hyperlipidemia,MWE. Jas Richard

## 2018-03-14 NOTE — PROGRESS NOTES
Chief Complaint   Patient presents with    Cholesterol Problem     f/u     1. Have you been to the ER, urgent care clinic since your last visit? Hospitalized since your last visit? No    2. Have you seen or consulted any other health care providers outside of the [address] [place] [address] since your last visit? Include any pap smears or colon screening.  No

## 2018-03-14 NOTE — MR AVS SNAPSHOT
[address] [place] [address] [address] 
[phone] Patient: Marielle Rivas MRN: [id number] CNE:[id number] Visit Information Date & Time Provider Department Dept. Phone Encounter #  
 3/14/2018 11:20 AM Lilibeth Urban MD [facility] [phone] [id number] Follow-up Instructions Return in about 6 months (around 9/14/2018) for hyperlipidemia,MWE. Your Appointments 9/19/2018 11:20 AM  
ROUTINE CARE with Liilbeth Urban MD  
[facility] [address]) Appt Note: R F/U  
 [place] [address] [place] [id number] Upcoming Health Maintenance Date Due COLONOSCOPY 11/3/1963 Bone Densitometry (Dexa) Screening 11/3/2012 Pneumococcal 65+ Low/Medium Risk (2 of 2 - PPSV23) 4/19/2017 BREAST CANCER SCRN MAMMOGRAM 1/14/2018 GLAUCOMA SCREENING Q2Y 9/2/2018 MEDICARE YEARLY EXAM 9/16/2018 DTaP/Tdap/Td series (2 - Td) 6/27/2021 Allergies as of 3/14/2018  Review Complete On: 3/14/2018 By: Lilibeth Urban MD  
  
 Severity Noted Reaction Type Reactions Adhesive  08/14/2013    Rash RED AND PUFFY SKIN Celebrex [Celecoxib]  06/17/2011    Other (comments) INCREASED BP Lipitor [Atorvastatin]  05/20/2016    Myalgia Niaspan [Niacin]  06/17/2011    Other (comments) SEVERE REDNESS, BURNING OF ENTIRE BODY WITH CHILLS Current Immunizations  Reviewed on 3/9/2017 Name Date Influenza High Dose Vaccine PF 12/5/2017 Influenza Vaccine 10/30/2015, 12/2/2014 Pneumococcal Conjugate (PCV-13) 4/19/2016 Tdap 6/27/2011 Zoster Vaccine, Live 12/27/2013 Not reviewed this visit You Were Diagnosed With   
  
 Codes Comments Pure hypercholesterolemia    -  Primary ICD-10-CM: E78.00 ICD-9-CM: 272.0 Screen for colon cancer     ICD-10-CM: Z12.11 ICD-9-CM: V76.51 Screening for breast cancer     ICD-10-CM: Z12.31 
ICD-9-CM: V76.10 Postmenopausal     ICD-10-CM: Z78.0 ICD-9-CM: V49.81 Vitals BP Pulse Temp Resp Height(growth percentile) Weight(growth percentile) 140/64 83 99 °F (37.2 °C) (Oral) 17 5' 1\" (1.549 m) 175 lb (79.4 kg) SpO2 BMI OB Status Smoking Status 97% 33.07 kg/m2 Hysterectomy Former Smoker Vitals History BMI and BSA Data Body Mass Index Body Surface Area 33.07 kg/m 2 1.85 m 2 Preferred Pharmacy Pharmacy Name Phone Justine Galvez [address] [place] [address] [address] [phone] Your Updated Medication List  
  
   
This list is accurate as of 3/14/18 12:25 PM.  Always use your most recent med list.  
  
  
  
  
 ALPRAZolam 0.5 mg tablet Commonly known as:  Naheed [place] Take 0.5-1 Tabs by mouth two (2) times daily as needed for Anxiety. Max Daily Amount: 1 mg.  
  
 atorvastatin 20 mg tablet Commonly known as:  LIPITOR  
TAKE 1 TABLET EVERY DAY  
  
 CENTRUM SILVER WOMEN PO Take 1 Tab by mouth daily. cyclobenzaprine 10 mg tablet Commonly known as:  FLEXERIL  
TAKE 1 TABLET ONCE A DAILY (SUBSTITUTED FOR  FLEXERIL)  
  
 estradiol 1 mg tablet Commonly known as:  ESTRACE Take 0.5 Tabs by mouth daily. furosemide 40 mg tablet Commonly known as:  LASIX TAKE ONE-HALF TO ONE TABLET BY MOUTH ONCE DAILY AS NEEDED (SWELLING) meloxicam 15 mg tablet Commonly known as:  MOBIC Take 1 Tab by mouth daily. Replaced diclofenac  
  
 trimethoprim-sulfamethoxazole 160-800 mg per tablet Commonly known as:  BACTRIM DS, SEPTRA DS Take 1 Tab by mouth two (2) times a day. For 3 days. As needed for uti VITAMIN D3 1,000 unit tablet Generic drug:  cholecalciferol Take 1,000 Units by mouth two (2) times a day. Prescriptions Sent to Pharmacy Refills trimethoprim-sulfamethoxazole (BACTRIM DS, SEPTRA DS) 160-800 mg per tablet 1 Sig: Take 1 Tab by mouth two (2) times a day. For 3 days. As needed for uti Class: Normal  
 Pharmacy: [address] [place] [address] Ph #: [phone] Route: Oral  
 cyclobenzaprine (FLEXERIL) 10 mg tablet 1 Sig: TAKE 1 TABLET ONCE A DAILY (SUBSTITUTED FOR  FLEXERIL) Class: Normal  
 Pharmacy: [address] [place] [address] Ph #: [phone]  
 estradiol (ESTRACE) 1 mg tablet 3 Sig: Take 0.5 Tabs by mouth daily. Class: Normal  
 Pharmacy: [address] [place] [address] Ph #: [phone] Route: Oral  
 meloxicam (MOBIC) 15 mg tablet 1 Sig: Take 1 Tab by mouth daily. Replaced diclofenac Class: Normal  
 Pharmacy: [address] [place] [address] Ph #: [phone] Route: Oral  
  
We Performed the Following LIPID PANEL [45622 CPT(R)] METABOLIC PANEL, COMPREHENSIVE [37143 CPT(R)] OCCULT BLOOD, IMMUNOASSAY (FIT) [id number] CPT(R)] Follow-up Instructions Return in about 6 months (around 9/14/2018) for hyperlipidemia,MWE. To-Do List   
 03/21/2018 Imaging:  DEXA BONE DENSITY STUDY AXIAL   
  
 03/21/2018 Imaging:  RISHI MAMMO BI SCREENING INCL CAD Introducing [facility]! Dear Hilaria Bean: 
Thank you for requesting a [employer] account. Our records indicate that you already have an active [employer] account. You can access your account anytime at [email or web address] Did you know that you can access your hospital and ER discharge instructions at any time in Natural Convergence? You can also review all of your test results from your hospital stay or ER visit. Additional Information If you have questions, please visit the Frequently Asked Questions section of the [employer] website at [email or web address] Remember, [employer] is NOT to be used for urgent needs. For medical emergencies, dial 911. Now available from your iPhone and Android! Please provide this summary of care documentation to your next provider. Your primary care clinician is listed as LISA ESQUIVEL. If you have any questions after today's visit, please call [phone].

## 2018-03-15 LAB
ALBUMIN SERPL-MCNC: 4.3 G/DL (ref 3.5–4.8)
ALBUMIN/GLOB SERPL: 1.5 {RATIO} (ref 1.2–2.2)
ALP SERPL-CCNC: 69 IU/L (ref 39–117)
ALT SERPL-CCNC: 36 IU/L (ref 0–32)
AST SERPL-CCNC: 38 IU/L (ref 0–40)
BILIRUB SERPL-MCNC: 0.6 MG/DL (ref 0–1.2)
BUN SERPL-MCNC: 16 MG/DL (ref 8–27)
BUN/CREAT SERPL: 25 (ref 12–28)
CALCIUM SERPL-MCNC: 9.3 MG/DL (ref 8.7–10.3)
CHLORIDE SERPL-SCNC: 100 MMOL/L (ref 96–106)
CHOLEST SERPL-MCNC: 227 MG/DL (ref 100–199)
CO2 SERPL-SCNC: 26 MMOL/L (ref 18–29)
CREAT SERPL-MCNC: 0.64 MG/DL (ref 0.57–1)
GFR SERPLBLD CREATININE-BSD FMLA CKD-EPI: 105 ML/MIN/1.73
GFR SERPLBLD CREATININE-BSD FMLA CKD-EPI: 91 ML/MIN/1.73
GLOBULIN SER CALC-MCNC: 2.8 G/DL (ref 1.5–4.5)
GLUCOSE SERPL-MCNC: 85 MG/DL (ref 65–99)
HDLC SERPL-MCNC: 50 MG/DL
INTERPRETATION, 910389: NORMAL
LDLC SERPL CALC-MCNC: 120 MG/DL (ref 0–99)
POTASSIUM SERPL-SCNC: 4.1 MMOL/L (ref 3.5–5.2)
PROT SERPL-MCNC: 7.1 G/DL (ref 6–8.5)
SODIUM SERPL-SCNC: 142 MMOL/L (ref 134–144)
TRIGL SERPL-MCNC: 287 MG/DL (ref 0–149)
VLDLC SERPL CALC-MCNC: 57 MG/DL (ref 5–40)

## 2018-07-24 ENCOUNTER — TELEPHONE (OUTPATIENT)
Dept: INTERNAL MEDICINE CLINIC | Age: 73
End: 2018-07-24

## 2018-07-24 DIAGNOSIS — S09.90XA INJURY OF HEAD, INITIAL ENCOUNTER: Primary | ICD-10-CM

## 2018-07-24 DIAGNOSIS — R11.0 NAUSEA: ICD-10-CM

## 2018-07-24 DIAGNOSIS — R51.9 ACUTE NONINTRACTABLE HEADACHE, UNSPECIFIED HEADACHE TYPE: ICD-10-CM

## 2018-07-24 NOTE — TELEPHONE ENCOUNTER
----- Message from Rita Ya LPN sent at 4/10/3433  7:22 PM EDT -----  Regarding: FW: Non-Urgent Medical Question  Contact: [phone]      ----- Message -----     From: Saw Davenport     Sent: 7/24/2018   7:14 PM       To: Mariluz Dempsey  Subject: RE: Non-Urgent Medical Question                  1:00 would work best. Could I get an EKG also?  ----- Message -----  From: Sin Coates MD  Sent: 7/24/2018  7:12 PM EDT  To: Yessy Solorio. Marcella Bueno  Subject: RE: Non-Urgent Medical Question    I can see you at either 8:40 or 1pm on Friday. I can order a head CT for Friday as well. If lab work is needed your can have it drawn next door. Please let me know what time works best for you and then we will get the head CT ordered. ----- Message -----     From: Meghann Bueno     Sent: 7/24/2018 11:46 AM EDT       To: Sin Coates MD  Subject: Non-Urgent Medical Question    Having a few problems. Had a bad fall a few weeks ago. My head bounced off of my left shoulder. I have had quite a bit of pain on my left side, but resolving somewhat now. I have been having a lot of headaches and some nausea. I thought I was having a heart attack one night last week. Pain radiating down my left arm, neck pain, headache and some nausea. Overall, I am not feeling well. I wanted to come see you, but not up to the drive. I dont want to have to make several trips to [place] for tests. Is there anyway to do any tests all in the same day? My daughter can drive me there this Friday if that can work in your schedule. Please advise. Thanks!     Miki Delvalle

## 2018-07-27 ENCOUNTER — HOSPITAL ENCOUNTER (OUTPATIENT)
Dept: CT IMAGING | Age: 73
Discharge: HOME | End: 2018-07-27
Attending: INTERNAL MEDICINE
Payer: MEDICARE

## 2018-07-27 ENCOUNTER — OFFICE VISIT (OUTPATIENT)
Dept: INTERNAL MEDICINE CLINIC | Age: 73
End: 2018-07-27

## 2018-07-27 VITALS
TEMPERATURE: 97.8 F | BODY MASS INDEX: 32.25 KG/M2 | RESPIRATION RATE: 18 BRPM | HEART RATE: 87 BPM | DIASTOLIC BLOOD PRESSURE: 79 MMHG | OXYGEN SATURATION: 98 % | SYSTOLIC BLOOD PRESSURE: 132 MMHG | WEIGHT: 170.8 LBS | HEIGHT: 61 IN

## 2018-07-27 DIAGNOSIS — S06.0X0A CONCUSSION WITHOUT LOSS OF CONSCIOUSNESS, INITIAL ENCOUNTER: Primary | ICD-10-CM

## 2018-07-27 DIAGNOSIS — R51.9 ACUTE NONINTRACTABLE HEADACHE, UNSPECIFIED HEADACHE TYPE: ICD-10-CM

## 2018-07-27 DIAGNOSIS — S09.90XA INJURY OF HEAD, INITIAL ENCOUNTER: ICD-10-CM

## 2018-07-27 DIAGNOSIS — S46.912A STRAIN OF LEFT SHOULDER, INITIAL ENCOUNTER: ICD-10-CM

## 2018-07-27 DIAGNOSIS — S16.1XXA STRAIN OF NECK MUSCLE, INITIAL ENCOUNTER: ICD-10-CM

## 2018-07-27 DIAGNOSIS — R11.0 NAUSEA: ICD-10-CM

## 2018-07-27 PROCEDURE — 70450 CT HEAD/BRAIN W/O DYE: CPT

## 2018-07-27 NOTE — PROGRESS NOTES
Chief Complaint   Patient presents with    Dizziness     H/A    Nausea     about three to four weeks     Arm Pain     Left      1. Have you been to the ER, urgent care clinic since your last visit? Hospitalized since your last visit? No    2. Have you seen or consulted any other health care providers outside of the [address] [place] [address] since your last visit? Include any pap smears or colon screening.  No

## 2018-07-27 NOTE — MR AVS SNAPSHOT
[address] [place] [address] [address] [place] 
[phone] Patient: Deanne Hickman MRN: [id number] Visit Information Date & Time Provider Department Dept. Phone Encounter #  
 7/27/2018  1:00 PM Kyle Cornell MD [facility] [phone] [id number] Your Appointments 9/19/2018 11:20 AM  
ROUTINE CARE with Kyle Cornell MD  
[facility]: R F/U  
 [place] [address] [facility] [address]  
[address] [facility] 121 [facility] 7 [id number] Upcoming Health Maintenance Date Due COLONOSCOPY 11/3/1965 Bone Densitometry (Dexa) Screening 11/3/2012 Pneumococcal 65+ Low/Medium Risk (2 of 2 - PPSV23) 4/19/2017 BREAST CANCER SCRN MAMMOGRAM 1/14/2018 GLAUCOMA SCREENING Q2Y 9/2/2018 Influenza Age 5 to Adult 8/1/2018 MEDICARE YEARLY EXAM 9/16/2018 DTaP/Tdap/Td series (2 - Td) 6/27/2021 Allergies as of 7/27/2018  Review Complete On: 7/27/2018 By: Jona Jay Severity Noted Reaction Type Reactions Adhesive  08/14/2013    Rash RED AND PUFFY SKIN Celebrex [Celecoxib]  06/17/2011    Other (comments) INCREASED BP Lipitor [Atorvastatin]  05/20/2016    Myalgia Niaspan [Niacin]  06/17/2011    Other (comments) SEVERE REDNESS, BURNING OF ENTIRE BODY WITH CHILLS Current Immunizations  Reviewed on 3/9/2017 Name Date Influenza High Dose Vaccine PF 12/5/2017 Influenza Vaccine 10/30/2015, 12/2/2014 Pneumococcal Conjugate (PCV-13) 4/19/2016 Tdap 6/27/2011 Zoster Vaccine, Live 12/27/2013 Not reviewed this visit Vitals BP Pulse Temp Resp Height(growth percentile) Weight(growth percentile) 132/79 87 97.8 °F (36.6 °C) (Oral) 18 5' 1\" (1.549 m) 170 lb 12.8 oz (77.5 kg) SpO2 BMI OB Status Smoking Status 98% 32.27 kg/m2 Hysterectomy Former Smoker Vitals History BMI and BSA Data Body Mass Index Body Surface Area  
 32.27 kg/m 2 1.83 m 2 [facility] [address] [place] [address] [phone] Your Updated Medication List  
  
   
This list is accurate as of 7/27/18  1:42 PM.  Always use your most recent med list.  
  
  
  
  
 ALPRAZolam 0.5 mg tablet Commonly known as:  Sharyon Kida Take 0.5-1 Tabs by mouth two (2) times daily as needed for Anxiety. Max Daily Amount: 1 mg.  
  
 atorvastatin 20 mg tablet Commonly known as:  LIPITOR  
TAKE 1 TABLET EVERY DAY  
  
 CENTRUM SILVER WOMEN PO Take 1 Tab by mouth daily. cyclobenzaprine 10 mg tablet Commonly known as:  FLEXERIL  
TAKE 1 TABLET ONCE A DAILY (SUBSTITUTED FOR  FLEXERIL)  
  
 estradiol 1 mg tablet Commonly known as:  ESTRACE Take 0.5 Tabs by mouth daily. furosemide 40 mg tablet Commonly known as:  LASIX TAKE ONE-HALF TO ONE TABLET BY MOUTH ONCE DAILY AS NEEDED (SWELLING) meloxicam 15 mg tablet Commonly known as:  MOBIC Take 1 Tab by mouth daily. Replaced diclofenac  
  
 trimethoprim-sulfamethoxazole 160-800 mg per tablet Commonly known as:  BACTRIM DS, SEPTRA DS Take 1 Tab by mouth two (2) times a day. For 3 days. As needed for uti VITAMIN D3 1,000 unit tablet Generic drug:  cholecalciferol Take 1,000 Units by mouth daily. To-Do List   
 07/27/2018 2:30 PM  
  Appointment with [facility] 1 at [facility] ([phone]) NON-CONTRAST STUDY: 1. [facility] films/images pertaining to the area of interest with you on the day of appointment. 2. Check in at registration at least 30 minutes before appt time unless you were instructed to do otherwise.  3. If you have to drink oral contrast please pick it up any weekday prior to your appointment, if you cannot please check in 2 hrs  before appt time. Patient Instructions Neck: Exercises Your Care Instructions Here are some examples of typical rehabilitation exercises for your condition. Start each exercise slowly. Ease off the exercise if you start to have pain. Your doctor or physical therapist will tell you when you can start these exercises and which ones will work best for you. How to do the exercises Neck stretch 1. This stretch works best if you keep your shoulder down as you lean away from it. To help you remember to do this, start by relaxing your shoulders and lightly holding on to your thighs or your chair. 2. Tilt your head toward your shoulder and hold for 15 to 30 seconds. Let the weight of your head stretch your muscles. 3. If you would like a little added stretch, use your hand to gently and steadily pull your head toward your shoulder. For example, keeping your right shoulder down, lean your head to the left. 4. Repeat 2 to 4 times toward each shoulder. Diagonal neck stretch 1. Turn your head slightly toward the direction you will be stretching, and tilt your head diagonally toward your chest and hold for 15 to 30 seconds. 2. If you would like a little added stretch, use your hand to gently and steadily pull your head forward on the diagonal. 
3. Repeat 2 to 4 times toward each side. Dorsal glide stretch The dorsal glide stretches the back of the neck. If you feel pain, do not glide so far back. Some people find this exercise easier to do while lying on their backs with an ice pack on the neck. 1. Sit or stand tall and look straight ahead. 2. Slowly tuck your chin as you glide your head backward over your body 3. Hold for a count of 6, and then relax for up to 10 seconds. 4. Repeat 8 to 12 times. Chest and shoulder stretch 1. Sit or stand tall and glide your head backward as in the dorsal glide stretch. 2. Raise both arms so that your hands are next to your ears. 3. Take a deep breath, and as you breathe out, lower your elbows down and behind your back. You will feel your shoulder blades slide down and together, and at the same time you will feel a stretch across your chest and the front of your shoulders. 4. Hold for about 6 seconds, and then relax for up to 10 seconds. 5. Repeat 8 to 12 times. Strengthening: Hands on head 1. Move your head backward, forward, and side to side against gentle pressure from your hands, holding each position for about 6 seconds. 2. Repeat 8 to 12 times. Follow-up care is a key part of your treatment and safety. Be sure to make and go to all appointments, and call your doctor if you are having problems. It's also a good idea to know your test results and keep a list of the medicines you take. Where can you learn more? Go to http://phill-abner.info/. Enter P975 in the search box to learn more about \"Neck: Exercises. \" Current as of: November 29, 2017 Content Version: 11.7 © 4983-1976 [employer] Insight. Care instructions adapted under license by Livestation (which disclaims liability or warranty for this information). If you have questions about a medical condition or this instruction, always ask your healthcare professional. [address] any warranty or liability for your use of this information. Rotator Cuff: Exercises Your Care Instructions Here are some examples of typical rehabilitation exercises for your condition. Start each exercise slowly. Ease off the exercise if you start to have pain. Your doctor or physical therapist will tell you when you can start these exercises and which ones will work best for you. How to do the exercises Pendulum swing If you have pain in your back, do not do this exercise. 5. Hold on to a table or the back of a chair with your good arm.  Then bend forward a little and let your sore arm hang straight down. This exercise does not use the arm muscles. Rather, use your legs and your hips to create movement that makes your arm swing freely. 6. Use the movement from your hips and legs to guide the slightly swinging arm back and forth like a pendulum (or elephant trunk). Then guide it in circles that start small (about the size of a dinner plate). Make the circles a bit larger each day, as your pain allows. 7. Do this exercise for 5 minutes, 5 to 7 times each day. 8. As you have less pain, try bending over a little farther to do this exercise. This will increase the amount of movement at your shoulder. Posterior stretching exercise 4. Hold the elbow of your injured arm with your other hand. 5. Use your hand to pull your injured arm gently up and across your body. You will feel a gentle stretch across the back of your injured shoulder. 6. Hold for at least 15 to 30 seconds. Then slowly lower your arm. 7. Repeat 2 to 4 times. Up-the-back stretch Your doctor or physical therapist may want you to wait to do this stretch until you have regained most of your range of motion and strength. You can do this stretch in different ways. Hold any of these stretches for at least 15 to 30 seconds. Repeat them 2 to 4 times. 5. Put your hand in your back pocket. Let it rest there to stretch your shoulder. 6. With your other hand, hold your injured arm (palm outward) behind your back by the wrist. Pull your arm up gently to stretch your shoulder. 7. Next, put a towel over your other shoulder. Put the hand of your injured arm behind your back. Now hold the back end of the towel. With the other hand, hold the front end of the towel in front of your body. Pull gently on the front end of the towel. This will bring your hand farther up your back to stretch your shoulder. Overhead stretch 6. Standing about an arm's length away, grasp onto a solid surface.  You could use a countertop, a doorknob, or the back of a sturdy chair. 7. With your knees slightly bent, bend forward with your arms straight. Lower your upper body, and let your shoulders stretch. 8. As your shoulders are able to stretch farther, you may need to take a step or two backward. 9. Hold for at least 15 to 30 seconds. Then stand up and relax. If you had stepped back during your stretch, step forward so you can keep your hands on the solid surface. 10. Repeat 2 to 4 times. Shoulder flexion (lying down) To make a wand for this exercise, use a piece of PVC pipe or a broom handle with the broom removed. Make the wand about a foot wider than your shoulders. 3. Lie on your back, holding a wand with both hands. Your palms should face down as you hold the wand. 4. Keeping your elbows straight, slowly raise your arms over your head. Raise them until you feel a stretch in your shoulders, upper back, and chest. 
5. Hold for 15 to 30 seconds. 6. Repeat 2 to 4 times. Shoulder rotation (lying down) To make a wand for this exercise, use a piece of PVC pipe or a broom handle with the broom removed. Make the wand about a foot wider than your shoulders. 1. Lie on your back. Hold a wand with both hands with your elbows bent and palms up. 2. Keep your elbows close to your body, and move the wand across your body toward the sore arm. 3. Hold for 8 to 12 seconds. 4. Repeat 2 to 4 times. Wall climbing (to the side) Avoid any movement that is straight to your side, and be careful not to arch your back. Your arm should stay about 30 degrees to the front of your side. 1. Stand with your side to a wall so that your fingers can just touch it at an angle about 30 degrees toward the front of your body. 2. Walk the fingers of your injured arm up the wall as high as pain permits. Try not to shrug your shoulder up toward your ear as you move your arm up. 3. Hold that position for a count of at least 15 to 20. 4. Walk your fingers back down to the starting position. 5. Repeat at least 2 to 4 times. Try to reach higher each time. Wall climbing (to the front) During this stretching exercise, be careful not to arch your back. 1. Face a wall, and stand so your fingers can just touch it. 2. Keeping your shoulder down, walk the fingers of your injured arm up the wall as high as pain permits. (Don't shrug your shoulder up toward your ear.) 3. Hold your arm in that position for at least 15 to 30 seconds. 4. Slowly walk your fingers back down to where you started. 5. Repeat at least 2 to 4 times. Try to reach higher each time. Shoulder blade squeeze 1. Stand with your arms at your sides, and squeeze your shoulder blades together. Do not raise your shoulders up as you squeeze. 2. Hold 6 seconds. 3. Repeat 8 to 12 times. Scapular exercise: Arm reach 1. Lie flat on your back. This exercise is a very slight motion that starts with your arms raised (elbows straight, arms straight). 2. From this position, reach higher toward the john or ceiling. Keep your elbows straight. All motion should be from your shoulder blade only. 3. Relax your arms back to where you started. 4. Repeat 8 to 12 times. Arm raise to the side During this strengthening exercise, your arm should stay about 30 degrees to the front of your side. 1. Slowly raise your injured arm to the side, with your thumb facing up. Raise your arm 60 degrees at the most (shoulder level is 90 degrees). 2. Hold the position for 3 to 5 seconds. Then lower your arm back to your side. If you need to, bring your \"good\" arm across your body and place it under the elbow as you lower your injured arm. Use your good arm to keep your injured arm from dropping down too fast. 
3. Repeat 8 to 12 times. 4. When you first start out, don't hold any extra weight in your hand. As you get stronger, you may use a 1-pound to 2-pound dumbbell or a small can of food. Shoulder flexor and extensor exercise These are isometric exercises. That means you contract your muscles without actually moving. 1. Push forward (flex): Stand facing a wall or doorjamb, about 6 inches or less back. Hold your injured arm against your body. Make a closed fist with your thumb on top. Then gently push your hand forward into the wall with about 25% to 50% of your strength. Don't let your body move backward as you push. Hold for about 6 seconds. Relax for a few seconds. Repeat 8 to 12 times. 2. Push backward (extend): Stand with your back flat against a wall. Your upper arm should be against the wall, with your elbow bent 90 degrees (your hand straight ahead). Push your elbow gently back against the wall with about 25% to 50% of your strength. Don't let your body move forward as you push. Hold for about 6 seconds. Relax for a few seconds. Repeat 8 to 12 times. Scapular exercise: Wall push-ups This exercise is best done with your fingers somewhat turned out, rather than straight up and down. 1. Stand facing a wall, about 12 inches to 18 inches away. 2. Place your hands on the wall at shoulder height. 3. Slowly bend your elbows and bring your face to the wall. Keep your back and hips straight. 4. Push back to where you started. 5. Repeat 8 to 12 times. 6. When you can do this exercise against a wall comfortably, you can try it against a counter. You can then slowly progress to the end of a couch, then to a sturdy chair, and finally to the floor. Scapular exercise: Retraction For this exercise, you will need elastic exercise material, such as surgical tubing or Thera-Band. 1. Put the band around a solid object at about waist level. (A bedpost will work well.) Each hand should hold an end of the band. 2. With your elbows at your sides and bent to 90 degrees, pull the band back. Your shoulder blades should move toward each other. Then move your arms back where you started. 3. Repeat 8 to 12 times. 4. If you have good range of motion in your shoulders, try this exercise with your arms lifted out to the sides. Keep your elbows at a 90-degree angle. Raise the elastic band up to about shoulder level. Pull the band back to move your shoulder blades toward each other. Then move your arms back where you started. Internal rotator strengthening exercise 1. Start by tying a piece of elastic exercise material to a doorknob. You can use surgical tubing or Thera-Band. 2. Stand or sit with your shoulder relaxed and your elbow bent 90 degrees. Your upper arm should rest comfortably against your side. Squeeze a rolled towel between your elbow and your body for comfort. This will help keep your arm at your side. 3. Hold one end of the elastic band in the hand of the painful arm. 4. Slowly rotate your forearm toward your body until it touches your belly. Slowly move it back to where you started. 5. Keep your elbow and upper arm firmly tucked against the towel roll or at your side. 6. Repeat 8 to 12 times. External rotator strengthening exercise 1. Start by tying a piece of elastic exercise material to a doorknob. You can use surgical tubing or Thera-Band. (You may also hold one end of the band in each hand.) 2. Stand or sit with your shoulder relaxed and your elbow bent 90 degrees. Your upper arm should rest comfortably against your side. Squeeze a rolled towel between your elbow and your body for comfort. This will help keep your arm at your side. 3. Hold one end of the elastic band with the hand of the painful arm. 4. Start with your forearm across your belly. Slowly rotate the forearm out away from your body. Keep your elbow and upper arm tucked against the towel roll or the side of your body until you begin to feel tightness in your shoulder. Slowly move your arm back to where you started. 5. Repeat 8 to 12 times. Follow-up care is a key part of your treatment and safety. Be sure to make and go to all appointments, and call your doctor if you are having problems. It's also a good idea to know your test results and keep a list of the medicines you take. Where can you learn more? Go to http://phill-abner.info/. Enter Jose Manuel Griffith in the search box to learn more about \"Rotator Cuff: Exercises. \" Current as of: November 29, 2017 Content Version: 11.7 © 1689-4794 AccelOne. Care instructions adapted under license by Silentium (which disclaims liability or warranty for this information). If you have questions about a medical condition or this instruction, always ask your healthcare professional. [address] any warranty or liability for your use of this information. Concussion: Care Instructions Your Care Instructions A concussion is a kind of injury to the brain. It happens when the head receives a hard blow. The impact can jar or shake the brain against the skull. This interrupts the brain's normal activities. Although you may have cuts or bruises on your head or face, you may have no other visible signs of a brain injury. In most cases, damage to the brain from a concussion can't be seen in tests such as a CT or MRI scan. For a few weeks, you may have low energy, dizziness, trouble sleeping, a headache, ringing in your ears, or nausea. You may also feel anxious, grumpy, or depressed. You may have problems with memory and concentration. These symptoms are common after a concussion. They should slowly improve over time. Sometimes this takes weeks or even months. Someone who lives with you should know how to care for you. Please share this and all information with a caregiver who will be available to help if needed. Follow-up care is a key part of your treatment and safety.  Be sure to make and go to all appointments, and call your doctor if you are having problems. It's also a good idea to know your test results and keep a list of the medicines you take. How can you care for yourself at home? Pain control · Put ice or a cold pack on the part of your head that hurts for 10 to 20 minutes at a time. Put a thin cloth between the ice and your skin. · Be safe with medicines. Read and follow all instructions on the label. ¨ If the doctor gave you a prescription medicine for pain, take it as prescribed. ¨ If you are not taking a prescription pain medicine, ask your doctor if you can take an over-the-counter medicine. Recovery · Follow your doctor's instructions. He or she will tell you if you need someone to watch you closely for the next 24 hours or longer. · Rest is the best way to recover from a concussion. You need to rest your body and your brain: ¨ Get plenty of sleep at night. And take rest breaks during the day. ¨ Avoid activities that take a lot of physical or mental work. This includes housework, exercise, schoolwork, video games, text messaging, and using the computer. ¨ You may need to change your school or work schedule while you recover. ¨ Return to your normal activities slowly. Do not try to do too much at once. · Do not drink alcohol or use illegal drugs. Alcohol and illegal drugs can slow your recovery. And they can increase your risk of a second brain injury. · Avoid activities that could lead to another concussion. Follow your doctor's instructions for a gradual return to activity and sports. · Ask your doctor when it's okay for you to drive a car, ride a bike, or operate machinery. How should you return to activity? Your return to activity can begin after 1 to 2 days of physical and mental rest. After resting, you can gradually increase your activity as long as it does not cause new symptoms or worsen your symptoms. Doctors and concussion specialists suggest steps to follow for returning to sports after a concussion. Use these steps as a guide. You should slowly progress through the following levels of activity: 
8. Limited activity. You can take part in daily activities as long as the activity doesn't increase your symptoms or cause new symptoms. 9. Light aerobic activity. This can include walking, swimming, or other exercise at less than 70% of maximum heart rate. No resistance training is included in this step. 10. Sport-specific exercise. This includes running drills or skating drills (depending on the sport), but no head impact. 11. Noncontact training drills. This includes more complex training drills such as passing. The athlete may also begin light resistance training. 12. Full-contact practice. The athlete can participate in normal training. 13. Return to normal game play. This is the final step and allows the athlete to join in normal game play. Watch and keep track of your progress. It should take at least 6 days for you to go from light activity to normal game play. Make sure that you can stay at each new level of activity for at least 24 hours without symptoms, or as long as your doctor says, before doing more. If one or more symptoms come back, return to a lower level of activity for at least 24 hours. Don't move on until all symptoms are gone. When should you call for help? Call 911 anytime you think you may need emergency care. For example, call if: 
  · You have a seizure.  
  · You passed out (lost consciousness).  
  · You are confused or can't stay awake.  
 Call your doctor now or seek immediate medical care if: 
  · You have new or worse vomiting.  
  · You feel less alert.  
  · You have new weakness or numbness in any part of your body.  
 Watch closely for changes in your health, and be sure to contact your doctor if: 
  · You do not get better as expected.   · You have new symptoms, such as headaches, trouble concentrating, or changes in mood. Where can you learn more? Go to http://phill-abner.info/. Enter F816 in the search box to learn more about \"Concussion: Care Instructions. \" Current as of: September 10, 2017 Content Version: 11.7 © 3317-1606 [place] city [place] [employer]. Care instructions adapted under license by Fashinating (which disclaims liability or warranty for this information). If you have questions about a medical condition or this instruction, always ask your healthcare professional. [address] any warranty or liability for your use of this information. Introducing [facility]! Dear Jud Canas: 
Thank you for requesting a [employer] account. Our records indicate that you already have an active [employer] account. You can access your account anytime at [email or web address] Did you know that you can access your hospital and ER discharge instructions at any time in [employer]? You can also review all of your test results from your hospital stay or ER visit. Additional Information If you have questions, please visit the Frequently Asked Questions section of the [employer] website at [email or web address] Remember, [employer] is NOT to be used for urgent needs. For medical emergencies, dial 911. Now available from your iPhone and Android! Please provide this summary of care documentation to your next provider. Your primary care clinician is listed as LISA ESQUIVEL. If you have any questions after today's visit, please call [phone].

## 2018-07-27 NOTE — PROGRESS NOTES
HISTORY OF PRESENT ILLNESS  Petr Scott is a 81 y.o. female. HPI  3 weeks ago, fell in garden, landed with left arm outstretchedand hit her head on her left shoulder. No LOC. Since that time head has been \"swimming\" and having interrmittent nausea. Headaches are in her bilat temples. Cognition is nml. . No vomiting. Left arm and shoulder pain. Had 1 night with pain down to fingers. No numbness or tingling. Taking her baseline meloxicam but also 3 advil prn. Comes in today because she was not getting better and daughter made her come in. Current Outpatient Prescriptions:     estradiol (ESTRACE) 1 mg tablet, Take 0.5 Tabs by mouth daily. , Disp: 45 Tab, Rfl: 3    meloxicam (MOBIC) 15 mg tablet, Take 1 Tab by mouth daily. Replaced diclofenac, Disp: 90 Tab, Rfl: 1    atorvastatin (LIPITOR) 20 mg tablet, TAKE 1 TABLET EVERY DAY, Disp: 90 Tab, Rfl: 3    ALPRAZolam (XANAX) 0.5 mg tablet, Take 0.5-1 Tabs by mouth two (2) times daily as needed for Anxiety. Max Daily Amount: 1 mg., Disp: 90 Tab, Rfl: 0    MULTIVITS-MIN/IRON/FA/LUTEIN (CENTRUM SILVER WOMEN PO), Take 1 Tab by mouth daily. , Disp: , Rfl:     furosemide (LASIX) 40 mg tablet, TAKE ONE-HALF TO ONE TABLET BY MOUTH ONCE DAILY AS NEEDED (SWELLING) (Patient taking differently: TAKE ONE TABLET BY MOUTH ONCE DAILY AS NEEDED (SWELLING)), Disp: 30 Tab, Rfl: 5    cholecalciferol (VITAMIN D3) 1,000 unit tablet, Take 1,000 Units by mouth daily. , Disp: , Rfl:     Visit Vitals    /79    Pulse 87    Temp 97.8 °F (36.6 °C) (Oral)    Resp 18    Ht 5' 1\" (1.549 m)    Wt 170 lb 12.8 oz (77.5 kg)    SpO2 98%    BMI 32.27 kg/m2       ROS  See above  Physical Exam   Constitutional: She is oriented to person, place, and time. She appears well-developed and well-nourished. HENT:   Head: Normocephalic and atraumatic.    Right Ear: External ear normal.   Left Ear: External ear normal.   Nose: Nose normal.   Mouth/Throat: Oropharynx is clear and moist. Eyes: Conjunctivae and EOM are normal. Pupils are equal, round, and reactive to light. Neck: Neck supple. cspine nontender but mild paraspinal and left trapezius tenderness. Limited rom in neck (s/p cervical fusion x 3 vertebrae)   Cardiovascular: Normal rate, regular rhythm and normal heart sounds. Exam reveals no gallop and no friction rub. No murmur heard. Pulmonary/Chest: Effort normal and breath sounds normal.   Musculoskeletal:   nml rom active and passive left shoulder, negative impingement sign. Lymphadenopathy:     She has no cervical adenopathy. Neurological: She is alert and oriented to person, place, and time. No cranial nerve deficit. Coordination normal.   Vitals reviewed. ASSESSMENT and PLAN  Concussion x 3 weeks - Head CT. Discussed resting brain, plenty of fluids  Left neck and shoulder strain - stretching exercises. Cautioned her not to take additional Advil on top of Meloxicam.  Tylenol, heat and stretching. Offered PT but pt declined at this time. No orders of the defined types were placed in this encounter. Follow-up Disposition:  Return if symptoms worsen or fail to improve.

## 2018-07-27 NOTE — PATIENT INSTRUCTIONS
Neck: Exercises  Your Care Instructions  Here are some examples of typical rehabilitation exercises for your condition. Start each exercise slowly. Ease off the exercise if you start to have pain. Your doctor or physical therapist will tell you when you can start these exercises and which ones will work best for you. How to do the exercises  Neck stretch    1. This stretch works best if you keep your shoulder down as you lean away from it. To help you remember to do this, start by relaxing your shoulders and lightly holding on to your thighs or your chair. 2. Tilt your head toward your shoulder and hold for 15 to 30 seconds. Let the weight of your head stretch your muscles. 3. If you would like a little added stretch, use your hand to gently and steadily pull your head toward your shoulder. For example, keeping your right shoulder down, lean your head to the left. 4. Repeat 2 to 4 times toward each shoulder. Diagonal neck stretch    1. Turn your head slightly toward the direction you will be stretching, and tilt your head diagonally toward your chest and hold for 15 to 30 seconds. 2. If you would like a little added stretch, use your hand to gently and steadily pull your head forward on the diagonal.  3. Repeat 2 to 4 times toward each side. Dorsal glide stretch    The dorsal glide stretches the back of the neck. If you feel pain, do not glide so far back. Some people find this exercise easier to do while lying on their backs with an ice pack on the neck. 1. Sit or stand tall and look straight ahead. 2. Slowly tuck your chin as you glide your head backward over your body  3. Hold for a count of 6, and then relax for up to 10 seconds. 4. Repeat 8 to 12 times. Chest and shoulder stretch    1. Sit or stand tall and glide your head backward as in the dorsal glide stretch. 2. Raise both arms so that your hands are next to your ears.   3. Take a deep breath, and as you breathe out, lower your elbows down and behind your back. You will feel your shoulder blades slide down and together, and at the same time you will feel a stretch across your chest and the front of your shoulders. 4. Hold for about 6 seconds, and then relax for up to 10 seconds. 5. Repeat 8 to 12 times. Strengthening: Hands on head    1. Move your head backward, forward, and side to side against gentle pressure from your hands, holding each position for about 6 seconds. 2. Repeat 8 to 12 times. Follow-up care is a key part of your treatment and safety. Be sure to make and go to all appointments, and call your doctor if you are having problems. It's also a good idea to know your test results and keep a list of the medicines you take. Where can you learn more? Go to http://phill-abner.info/. Enter P975 in the search box to learn more about \"Neck: Exercises. \"  Current as of: November 29, 2017  Content Version: 11.7  © 5367-5928 [employer]. Care instructions adapted under license by [employer] (which disclaims liability or warranty for this information). If you have questions about a medical condition or this instruction, always ask your healthcare professional. [address] any warranty or liability for your use of this information. Rotator Cuff: Exercises  Your Care Instructions  Here are some examples of typical rehabilitation exercises for your condition. Start each exercise slowly. Ease off the exercise if you start to have pain. Your doctor or physical therapist will tell you when you can start these exercises and which ones will work best for you. How to do the exercises  Pendulum swing    If you have pain in your back, do not do this exercise. 5. Hold on to a table or the back of a chair with your good arm. Then bend forward a little and let your sore arm hang straight down. This exercise does not use the arm muscles.  Rather, use your legs and your hips to create movement that makes your arm swing freely. 6. Use the movement from your hips and legs to guide the slightly swinging arm back and forth like a pendulum (or elephant trunk). Then guide it in circles that start small (about the size of a dinner plate). Make the circles a bit larger each day, as your pain allows. 7. Do this exercise for 5 minutes, 5 to 7 times each day. 8. As you have less pain, try bending over a little farther to do this exercise. This will increase the amount of movement at your shoulder. Posterior stretching exercise    4. Hold the elbow of your injured arm with your other hand. 5. Use your hand to pull your injured arm gently up and across your body. You will feel a gentle stretch across the back of your injured shoulder. 6. Hold for at least 15 to 30 seconds. Then slowly lower your arm. 7. Repeat 2 to 4 times. Up-the-back stretch    Your doctor or physical therapist may want you to wait to do this stretch until you have regained most of your range of motion and strength. You can do this stretch in different ways. Hold any of these stretches for at least 15 to 30 seconds. Repeat them 2 to 4 times. 5. Put your hand in your back pocket. Let it rest there to stretch your shoulder. 6. With your other hand, hold your injured arm (palm outward) behind your back by the wrist. Pull your arm up gently to stretch your shoulder. 7. Next, put a towel over your other shoulder. Put the hand of your injured arm behind your back. Now hold the back end of the towel. With the other hand, hold the front end of the towel in front of your body. Pull gently on the front end of the towel. This will bring your hand farther up your back to stretch your shoulder. Overhead stretch    6. Standing about an arm's length away, grasp onto a solid surface. You could use a countertop, a doorknob, or the back of a sturdy chair. 7. With your knees slightly bent, bend forward with your arms straight.  Lower your upper body, and let your shoulders stretch. 8. As your shoulders are able to stretch farther, you may need to take a step or two backward. 9. Hold for at least 15 to 30 seconds. Then stand up and relax. If you had stepped back during your stretch, step forward so you can keep your hands on the solid surface. 10. Repeat 2 to 4 times. Shoulder flexion (lying down)    To make a wand for this exercise, use a piece of PVC pipe or a broom handle with the broom removed. Make the wand about a foot wider than your shoulders. 3. Lie on your back, holding a wand with both hands. Your palms should face down as you hold the wand. 4. Keeping your elbows straight, slowly raise your arms over your head. Raise them until you feel a stretch in your shoulders, upper back, and chest.  5. Hold for 15 to 30 seconds. 6. Repeat 2 to 4 times. Shoulder rotation (lying down)    To make a wand for this exercise, use a piece of PVC pipe or a broom handle with the broom removed. Make the wand about a foot wider than your shoulders. 1. Lie on your back. Hold a wand with both hands with your elbows bent and palms up. 2. Keep your elbows close to your body, and move the wand across your body toward the sore arm. 3. Hold for 8 to 12 seconds. 4. Repeat 2 to 4 times. Wall climbing (to the side)    Avoid any movement that is straight to your side, and be careful not to arch your back. Your arm should stay about 30 degrees to the front of your side. 1. Stand with your side to a wall so that your fingers can just touch it at an angle about 30 degrees toward the front of your body. 2. Walk the fingers of your injured arm up the wall as high as pain permits. Try not to shrug your shoulder up toward your ear as you move your arm up. 3. Hold that position for a count of at least 15 to 20.  4. Walk your fingers back down to the starting position. 5. Repeat at least 2 to 4 times. Try to reach higher each time.   Wall climbing (to the front)    During this stretching exercise, be careful not to arch your back. 1. Face a wall, and stand so your fingers can just touch it. 2. Keeping your shoulder down, walk the fingers of your injured arm up the wall as high as pain permits. (Don't shrug your shoulder up toward your ear.)  3. Hold your arm in that position for at least 15 to 30 seconds. 4. Slowly walk your fingers back down to where you started. 5. Repeat at least 2 to 4 times. Try to reach higher each time. Shoulder blade squeeze    1. Stand with your arms at your sides, and squeeze your shoulder blades together. Do not raise your shoulders up as you squeeze. 2. Hold 6 seconds. 3. Repeat 8 to 12 times. Scapular exercise: Arm reach    1. Lie flat on your back. This exercise is a very slight motion that starts with your arms raised (elbows straight, arms straight). 2. From this position, reach higher toward the john or ceiling. Keep your elbows straight. All motion should be from your shoulder blade only. 3. Relax your arms back to where you started. 4. Repeat 8 to 12 times. Arm raise to the side    During this strengthening exercise, your arm should stay about 30 degrees to the front of your side. 1. Slowly raise your injured arm to the side, with your thumb facing up. Raise your arm 60 degrees at the most (shoulder level is 90 degrees). 2. Hold the position for 3 to 5 seconds. Then lower your arm back to your side. If you need to, bring your \"good\" arm across your body and place it under the elbow as you lower your injured arm. Use your good arm to keep your injured arm from dropping down too fast.  3. Repeat 8 to 12 times. 4. When you first start out, don't hold any extra weight in your hand. As you get stronger, you may use a 1-pound to 2-pound dumbbell or a small can of food. Shoulder flexor and extensor exercise    These are isometric exercises. That means you contract your muscles without actually moving.   1. Push forward (flex): Stand facing a wall or doorjamb, about 6 inches or less back. Hold your injured arm against your body. Make a closed fist with your thumb on top. Then gently push your hand forward into the wall with about 25% to 50% of your strength. Don't let your body move backward as you push. Hold for about 6 seconds. Relax for a few seconds. Repeat 8 to 12 times. 2. Push backward (extend): Stand with your back flat against a wall. Your upper arm should be against the wall, with your elbow bent 90 degrees (your hand straight ahead). Push your elbow gently back against the wall with about 25% to 50% of your strength. Don't let your body move forward as you push. Hold for about 6 seconds. Relax for a few seconds. Repeat 8 to 12 times. Scapular exercise: Wall push-ups    This exercise is best done with your fingers somewhat turned out, rather than straight up and down. 1. Stand facing a wall, about 12 inches to 18 inches away. 2. Place your hands on the wall at shoulder height. 3. Slowly bend your elbows and bring your face to the wall. Keep your back and hips straight. 4. Push back to where you started. 5. Repeat 8 to 12 times. 6. When you can do this exercise against a wall comfortably, you can try it against a counter. You can then slowly progress to the end of a couch, then to a sturdy chair, and finally to the floor. Scapular exercise: Retraction    For this exercise, you will need elastic exercise material, such as surgical tubing or Thera-Band. 1. Put the band around a solid object at about waist level. (A bedpost will work well.) Each hand should hold an end of the band. 2. With your elbows at your sides and bent to 90 degrees, pull the band back. Your shoulder blades should move toward each other. Then move your arms back where you started. 3. Repeat 8 to 12 times. 4. If you have good range of motion in your shoulders, try this exercise with your arms lifted out to the sides. Keep your elbows at a 90-degree angle.  Raise the elastic band up to about shoulder level. Pull the band back to move your shoulder blades toward each other. Then move your arms back where you started. Internal rotator strengthening exercise    1. Start by tying a piece of elastic exercise material to a doorknob. You can use surgical tubing or Thera-Band. 2. Stand or sit with your shoulder relaxed and your elbow bent 90 degrees. Your upper arm should rest comfortably against your side. Squeeze a rolled towel between your elbow and your body for comfort. This will help keep your arm at your side. 3. Hold one end of the elastic band in the hand of the painful arm. 4. Slowly rotate your forearm toward your body until it touches your belly. Slowly move it back to where you started. 5. Keep your elbow and upper arm firmly tucked against the towel roll or at your side. 6. Repeat 8 to 12 times. External rotator strengthening exercise    1. Start by tying a piece of elastic exercise material to a doorknob. You can use surgical tubing or Thera-Band. (You may also hold one end of the band in each hand.)  2. Stand or sit with your shoulder relaxed and your elbow bent 90 degrees. Your upper arm should rest comfortably against your side. Squeeze a rolled towel between your elbow and your body for comfort. This will help keep your arm at your side. 3. Hold one end of the elastic band with the hand of the painful arm. 4. Start with your forearm across your belly. Slowly rotate the forearm out away from your body. Keep your elbow and upper arm tucked against the towel roll or the side of your body until you begin to feel tightness in your shoulder. Slowly move your arm back to where you started. 5. Repeat 8 to 12 times. Follow-up care is a key part of your treatment and safety. Be sure to make and go to all appointments, and call your doctor if you are having problems. It's also a good idea to know your test results and keep a list of the medicines you take.   Where can you learn more? Go to http://phill-abner.info/. Enter [place] in the search box to learn more about \"Rotator Cuff: Exercises. \"  Current as of: November 29, 2017  Content Version: 11.7  © 4539-9977 [employer]. Care instructions adapted under license by [employer] (which disclaims liability or warranty for this information). If you have questions about a medical condition or this instruction, always ask your healthcare professional. [place] [address] any warranty or liability for your use of this information. Concussion: Care Instructions  Your Care Instructions    A concussion is a kind of injury to the brain. It happens when the head receives a hard blow. The impact can jar or shake the brain against the skull. This interrupts the brain's normal activities. Although you may have cuts or bruises on your head or face, you may have no other visible signs of a brain injury. In most cases, damage to the brain from a concussion can't be seen in tests such as a CT or MRI scan. For a few weeks, you may have low energy, dizziness, trouble sleeping, a headache, ringing in your ears, or nausea. You may also feel anxious, grumpy, or depressed. You may have problems with memory and concentration. These symptoms are common after a concussion. They should slowly improve over time. Sometimes this takes weeks or even months. Someone who lives with you should know how to care for you. Please share this and all information with a caregiver who will be available to help if needed. Follow-up care is a key part of your treatment and safety. Be sure to make and go to all appointments, and call your doctor if you are having problems. It's also a good idea to know your test results and keep a list of the medicines you take. How can you care for yourself at home? Pain control  · Put ice or a cold pack on the part of your head that hurts for 10 to 20 minutes at a time. Put a thin cloth between the ice and your skin. · Be safe with medicines. Read and follow all instructions on the label. ¨ If the doctor gave you a prescription medicine for pain, take it as prescribed. ¨ If you are not taking a prescription pain medicine, ask your doctor if you can take an over-the-counter medicine. Recovery  · Follow your doctor's instructions. He or she will tell you if you need someone to watch you closely for the next 24 hours or longer. · Rest is the best way to recover from a concussion. You need to rest your body and your brain:  ¨ Get plenty of sleep at night. And take rest breaks during the day. ¨ Avoid activities that take a lot of physical or mental work. This includes housework, exercise, schoolwork, video games, text messaging, and using the computer. ¨ You may need to change your school or work schedule while you recover. ¨ Return to your normal activities slowly. Do not try to do too much at once. · Do not drink alcohol or use illegal drugs. Alcohol and illegal drugs can slow your recovery. And they can increase your risk of a second brain injury. · Avoid activities that could lead to another concussion. Follow your doctor's instructions for a gradual return to activity and sports. · Ask your doctor when it's okay for you to drive a car, ride a bike, or operate machinery. How should you return to activity? Your return to activity can begin after 1 to 2 days of physical and mental rest. After resting, you can gradually increase your activity as long as it does not cause new symptoms or worsen your symptoms. Doctors and concussion specialists suggest steps to follow for returning to sports after a concussion. Use these steps as a guide. You should slowly progress through the following levels of activity:  8. Limited activity. You can take part in daily activities as long as the activity doesn't increase your symptoms or cause new symptoms. 9. Light aerobic activity.  This can include walking, swimming, or other exercise at less than 70% of maximum heart rate. No resistance training is included in this step. 10. Sport-specific exercise. This includes running drills or skating drills (depending on the sport), but no head impact. 11. Noncontact training drills. This includes more complex training drills such as passing. The athlete may also begin light resistance training. 12. Full-contact practice. The athlete can participate in normal training. 13. Return to normal game play. This is the final step and allows the athlete to join in normal game play. Watch and keep track of your progress. It should take at least 6 days for you to go from light activity to normal game play. Make sure that you can stay at each new level of activity for at least 24 hours without symptoms, or as long as your doctor says, before doing more. If one or more symptoms come back, return to a lower level of activity for at least 24 hours. Don't move on until all symptoms are gone. When should you call for help? Call 911 anytime you think you may need emergency care. For example, call if:    · You have a seizure.     · You passed out (lost consciousness).     · You are confused or can't stay awake.    Call your doctor now or seek immediate medical care if:    · You have new or worse vomiting.     · You feel less alert.     · You have new weakness or numbness in any part of your body.    Watch closely for changes in your health, and be sure to contact your doctor if:    · You do not get better as expected.     · You have new symptoms, such as headaches, trouble concentrating, or changes in mood. Where can you learn more? Go to http://phill-abner.info/. Enter P918 in the search box to learn more about \"Concussion: Care Instructions. \"  Current as of: September 10, 2017  Content Version: 11.7  © 6448-0804 [employer]  Care instructions adapted under license by [employer] (which disclaims liability or warranty for this information). If you have questions about a medical condition or this instruction, always ask your healthcare professional. [address] any warranty or liability for your use of this information.

## 2018-08-02 RX ORDER — MELOXICAM 15 MG/1
TABLET ORAL
Qty: 90 TAB | Refills: 1 | Status: SHIPPED | OUTPATIENT
Start: 2018-08-02 | End: 2018-10-26 | Stop reason: SDUPTHER

## 2018-09-19 ENCOUNTER — OFFICE VISIT (OUTPATIENT)
Dept: INTERNAL MEDICINE CLINIC | Age: 73
End: 2018-09-19

## 2018-09-19 ENCOUNTER — HOSPITAL ENCOUNTER (OUTPATIENT)
Dept: LAB | Age: 73
Discharge: HOME | End: 2018-09-19
Payer: MEDICARE

## 2018-09-19 VITALS
BODY MASS INDEX: 31.72 KG/M2 | SYSTOLIC BLOOD PRESSURE: 118 MMHG | TEMPERATURE: 98.6 F | RESPIRATION RATE: 16 BRPM | HEART RATE: 77 BPM | DIASTOLIC BLOOD PRESSURE: 76 MMHG | OXYGEN SATURATION: 97 % | HEIGHT: 61 IN | WEIGHT: 168 LBS

## 2018-09-19 DIAGNOSIS — Z12.31 ENCOUNTER FOR SCREENING MAMMOGRAM FOR MALIGNANT NEOPLASM OF BREAST: ICD-10-CM

## 2018-09-19 DIAGNOSIS — Z78.0 POSTMENOPAUSAL: ICD-10-CM

## 2018-09-19 DIAGNOSIS — M89.9 DISORDER OF BONE AND CARTILAGE: ICD-10-CM

## 2018-09-19 DIAGNOSIS — Z78.9 ADVANCE DIRECTIVE ON FILE: ICD-10-CM

## 2018-09-19 DIAGNOSIS — M94.9 DISORDER OF BONE AND CARTILAGE: ICD-10-CM

## 2018-09-19 DIAGNOSIS — M54.42 ACUTE LEFT-SIDED LOW BACK PAIN WITH LEFT-SIDED SCIATICA: ICD-10-CM

## 2018-09-19 DIAGNOSIS — E66.9 OBESITY (BMI 30.0-34.9): ICD-10-CM

## 2018-09-19 DIAGNOSIS — M70.62 TROCHANTERIC BURSITIS, LEFT HIP: ICD-10-CM

## 2018-09-19 DIAGNOSIS — E78.00 PURE HYPERCHOLESTEROLEMIA: Primary | ICD-10-CM

## 2018-09-19 DIAGNOSIS — Z00.00 MEDICARE ANNUAL WELLNESS VISIT, SUBSEQUENT: ICD-10-CM

## 2018-09-19 PROCEDURE — 36415 COLL VENOUS BLD VENIPUNCTURE: CPT

## 2018-09-19 PROCEDURE — 80076 HEPATIC FUNCTION PANEL: CPT

## 2018-09-19 PROCEDURE — 80061 LIPID PANEL: CPT

## 2018-09-19 NOTE — PROGRESS NOTES
Subjective: Emma Jade is a 81 y.o. female who is seen for follow up of hypertension. Cardiovascular risk analysis - 81 y.o. female LDL goal is under 130. Compliance with treatment thus far has been good. ROS: taking medications as instructed, no medication side effects noted, no TIA's, no chest pain on exertion, no dyspnea on exertion, no swelling of ankles, no orthostatic dizziness or lightheadedness, no palpitations. New concerns:   
Seen at Urgent Care 1 month ago for left ear pain. Cleaned out eas and placed on Amox (for redness) x 1 week. No improvement in discomfort - in ear and posterior. No pain with chewing. Had decreased hearing but improved after wax cleared. Left lower back pain radiating to buttocks and to left hip. Tenderness left hip. Started after fall 2 months ago. Has an appointment with her orthopedist (Dr. Paco Caballero) later today. Has not been stretching or taking any medications for this but is on her regular meloxicam.  
Depression has been well controlled. Taking care of son with disabilities. Rare use of xanax. Current Outpatient Prescriptions Medication Sig Dispense Refill  pneumococcal 23-merna ps vaccine (PNEUMOVAX 23) 25 mcg/0.5 mL injection 0.5 mL by IntraMUSCular route once for 1 dose. 0.5 mL 0  
 varicella-zoster recombinant, PF, (SHINGRIX, PF,) 50 mcg/0.5 mL susr injection 0.5mL by IntraMUSCular route once now and then repeat in 2-6 months 0.5 mL 1  
 meloxicam (MOBIC) 15 mg tablet TAKE 1 TABLET DAILY (REPLACES DICLOFENAC) 90 Tab 1  
 estradiol (ESTRACE) 1 mg tablet Take 0.5 Tabs by mouth daily. 45 Tab 3  
 atorvastatin (LIPITOR) 20 mg tablet TAKE 1 TABLET EVERY DAY 90 Tab 3  MULTIVITS-MIN/IRON/FA/LUTEIN (CENTRUM SILVER WOMEN PO) Take 1 Tab by mouth daily.  furosemide (LASIX) 40 mg tablet TAKE ONE-HALF TO ONE TABLET BY MOUTH ONCE DAILY AS NEEDED (SWELLING) (Patient taking differently: Taking 1 every other day as needed.) 30 Tab 5  cholecalciferol (VITAMIN D3) 1,000 unit tablet Take 1,000 Units by mouth daily.  ALPRAZolam (XANAX) 0.5 mg tablet Take 0.5-1 Tabs by mouth two (2) times daily as needed for Anxiety. Max Daily Amount: 1 mg. 90 Tab 0 Lab Results Component Value Date/Time Cholesterol, total 227 (H) 03/14/2018 01:03 PM  
 HDL Cholesterol 50 03/14/2018 01:03 PM  
 LDL, calculated 120 (H) 03/14/2018 01:03 PM  
 Triglyceride 287 (H) 03/14/2018 01:03 PM  
 
Lab Results Component Value Date/Time ALT (SGPT) 36 (H) 03/14/2018 01:03 PM  
 AST (SGOT) 38 03/14/2018 01:03 PM  
 Alk. phosphatase 69 03/14/2018 01:03 PM  
 Bilirubin, direct 0.15 09/11/2017 12:28 PM  
 Bilirubin, total 0.6 03/14/2018 01:03 PM  
 Albumin 4.3 03/14/2018 01:03 PM  
 Protein, total 7.1 03/14/2018 01:03 PM  
 INR 1.0 08/14/2013 10:57 AM  
 Prothrombin time 10.3 08/14/2013 10:57 AM  
 PLATELET 262 29/70/6595 12:55 PM  
 Hepatitis B surface Ag 0.12 08/28/2013 03:00 PM  
 
  
 
Objective:  
 
Visit Vitals  /76  Pulse 77  Temp 98.6 °F (37 °C) (Oral)  Resp 16  
 Ht 5' 1\" (1.549 m)  Wt 168 lb (76.2 kg)  SpO2 97%  BMI 31.74 kg/m2 Appearance: alert, well appearing, and in no distress. CVS exam  
bilat EARS - nml ext canal and TM Jaw - no clicking,popping or tenderness NECK: supple, no lad, no bruit, no tm LUNGS: cta bilat CV rrr, no m/g/r ABD: soft, nt, nd, nabs EXT: no c/c/e. Lower back - lspine and paraspinal muscles nontender. Tenderness left buttocks and left trochanteric bursa. .  
 
Assessment/Plan: Hyperlipidemia well controlled. current treatment plan is effective, no change in therapy. Check labs Depression - well controlled, cont same Left ear pain - ? Intermittent eustachian tube dysfunction -trial Flonase. Orders Placed This Encounter  Dexa Bone Density Study Axial ([id number])  Bilateral Digital Screening Mammography  LIPID PANEL  
 HEPATIC FUNCTION PANEL  
  pneumococcal 23-merna ps vaccine (PNEUMOVAX 23) 25 mcg/0.5 mL injection  varicella-zoster recombinant, PF, (SHINGRIX, PF,) 50 mcg/0.5 mL susr injection Follow-up Disposition: Not on File. This is the Subsequent Medicare Annual Wellness Exam, performed 12 months or more after the Initial AWV or the last Subsequent AWV I have reviewed the patient's medical history in detail and updated the computerized patient record. History Past Medical History:  
Diagnosis Date  Anxiety and depression  Calculus AT AGE 14; NO RECURRENCE  
 Cataracts, bilateral   
 Chronic pain SINCE AGE 20-LOWER BACK INJ.-SKIING ACCIDENT  DJD (degenerative joint disease) MULTIPLE SITES-KNEES,GREAT TOES,SPINE  
 H/O scarlet fever  Hypercholesterolemia  Multinodular goiter  Nausea & vomiting   
 /P HYSTERECTOMY 1980s  Other ill-defined conditions(799.89) R LOWER LEG ANTERIORLY-NO SENSATION HOT/COL,SHARP/DULL  
 Pain 5/4/10 ADMITTED FOR INTRACTABLE PAIN--RIGHT SHIN NUMB EVER SINCE  
 Unspecified adverse effect of anesthesia NORMAL DOSE VERSED INEFFECTIVE  
 UTI (lower urinary tract infection) NONE IN A YEAR; H/O FREQUENT UTIs (4-5 A YEAR)-MACRODANTIN /P HAVING SEX Past Surgical History:  
Procedure Laterality Date  ENDOSCOPY, COLON, DIAGNOSTIC  2/2008  
 polyp ([place])-f/u 5 yrs. [facility] ENDOSCOPY  
 HX HEENT  1998 BILAT UPPER & LOWER BLEPHOROPLASTY  HX HYSTERECTOMY  4/1980 WITH ANTERIOR REPAIR  
 HX ORTHOPAEDIC  X3  LATE 1980s BASILAR THUMB JOINT REPLACEMENT  
 [address] ORIF L WRIST FRACTURE  
 [address] WRIST REMOVAL PLATE & SCREWS  
 HX ORTHOPAEDIC  2006 EXC SRTHRITIC NODULES R GREAT & 2ND TOES  
Miranda Daviss ORTHOPAEDIC  2006 BUNIONECTOMY RIGHT  HX ORTHOPAEDIC  07/12 C [address] CARPAL TUNNEL MANAN  
 HX ORTHOPAEDIC  10/12 LEFT BUNIONECTOMY, GREAT TOE, HAMMERTOE CORRECTION,  
 HX ORTHOPAEDIC    
 CORRECTION OF LEFT 3RD TOE AND NEUROMA REMOVAL  
 HX OTHER SURGICAL  3/2013 Correction of left 3rd toe. [address]  HX UROLOGICAL  7/09 BLADDER SLING Current Outpatient Prescriptions Medication Sig Dispense Refill  pneumococcal 23-merna ps vaccine (PNEUMOVAX 23) 25 mcg/0.5 mL injection 0.5 mL by IntraMUSCular route once for 1 dose. 0.5 mL 0  
 varicella-zoster recombinant, PF, (SHINGRIX, PF,) 50 mcg/0.5 mL susr injection 0.5mL by IntraMUSCular route once now and then repeat in 2-6 months 0.5 mL 1  
 meloxicam (MOBIC) 15 mg tablet TAKE 1 TABLET DAILY (REPLACES DICLOFENAC) 90 Tab 1  
 estradiol (ESTRACE) 1 mg tablet Take 0.5 Tabs by mouth daily. 45 Tab 3  
 atorvastatin (LIPITOR) 20 mg tablet TAKE 1 TABLET EVERY DAY 90 Tab 3  MULTIVITS-MIN/IRON/FA/LUTEIN (CENTRUM SILVER WOMEN PO) Take 1 Tab by mouth daily.  furosemide (LASIX) 40 mg tablet TAKE ONE-HALF TO ONE TABLET BY MOUTH ONCE DAILY AS NEEDED (SWELLING) (Patient taking differently: Taking 1 every other day as needed.) 30 Tab 5  cholecalciferol (VITAMIN D3) 1,000 unit tablet Take 1,000 Units by mouth daily.  ALPRAZolam (XANAX) 0.5 mg tablet Take 0.5-1 Tabs by mouth two (2) times daily as needed for Anxiety. Max Daily Amount: 1 mg. 90 Tab 0 Allergies Allergen Reactions  Adhesive Rash RED AND PUFFY SKIN  
 Celebrex [Celecoxib] Other (comments) INCREASED BP  
 Lipitor [Atorvastatin] Myalgia  Niaspan [Niacin] Other (comments) SEVERE REDNESS, BURNING OF ENTIRE BODY WITH CHILLS Family History Problem Relation Age of Onset  Post-op Nausea/Vomiting Mother  Diabetes Mother  Coronary Artery Disease Mother  High Cholesterol Mother  Hypertension Mother  Heart Disease Mother CARDIOMYOPATHY, CHF  Cancer Father   
  oat cell of lung  Alcohol abuse Father  Hypertension Sister  Hypertension Brother  High Cholesterol Brother  Other Sister Sjogren's  Arthritis-osteo Sister  Cancer Maternal Uncle COLON  
 Cancer Paternal Uncle  Cancer Paternal Uncle COLON  Liver Disease Son  Downs Syndrome Son   
 Cancer Paternal Aunt BLADDER Social History Substance Use Topics  Smoking status: Former Smoker Packs/day: 3.00 Years: 20.00 Quit date: 6/17/1983  Smokeless tobacco: Never Used Comment: STARTED SMOKING AT AGE 11/ 1PPD BY AGE 14  
 Alcohol use 1.2 - 1.8 oz/week 0 - 1 Standard drinks or equivalent, 1 Glasses of wine, 1 Shots of liquor per week Comment: SOCIALLY Patient Active Problem List  
Diagnosis Code  Cervical spinal stenosis M48.02  Severe back pain M54.9  Abnormal urine odor R82.90  Hyperlipidemia E78.5  Depression F32.9  Recurrent UTI N39.0  Copy of advanced directive obtained Z78.9  
 Goiter, nodular E04.9  Vitamin D deficiency E55.9  Advance directive on file V46.8 Depression Risk Factor Screening: PHQ over the last two weeks 6/30/2014 PHQ Not Done - Little interest or pleasure in doing things Not at all Feeling down, depressed, irritable, or hopeless Not at all Total Score PHQ 2 0 Alcohol Risk Factor Screening: You do not drink alcohol or very rarely. Functional Ability and Level of Safety:  
Hearing Loss Hearing is good. Activities of Daily Living The home contains: handrails and grab bars in bathroom and shower Patient does total self care Fall Risk Fall Risk Assessment, last 12 mths 9/19/2018 Able to walk? Yes Fall in past 12 months? Yes Fall with injury? Yes  
Number of falls in past 12 months 1 Fall Risk Score 2 Abuse Screen Patient is not abused Cognitive Screening Evaluation of Cognitive Function: 
Has your family/caregiver stated any concerns about your memory: no 
Normal 
 
Patient Care Team  
 Patient Care Team: 
Johnson Escalante MD as PCP - General (Internal Medicine) Pau Sparrow MD (Orthopedic Surgery) Helen Guillen MD (Ophthalmology) Avril Patel, RN as Ambulatory Care Navigator (Internal Medicine) Assessment/Plan Education and counseling provided: 
Are appropriate based on today's review and evaluation Advanced directive discussed with pt. Diagnoses and all orders for this visit: 1. Pure hypercholesterolemia -     LIPID PANEL 
-     HEPATIC FUNCTION PANEL 2. Advance directive on file 3. Postmenopausal 
-     Dexa Bone Density Study Axial ([id number]); Future 4. Medicare annual wellness visit, subsequent -     pneumococcal 23-merna ps vaccine (PNEUMOVAX 23) 25 mcg/0.5 mL injection; 0.5 mL by IntraMUSCular route once for 1 dose. 
-     varicella-zoster recombinant, PF, (SHINGRIX, PF,) 50 mcg/0.5 mL susr injection; 0.5mL by IntraMUSCular route once now and then repeat in 2-6 months 5. Disorder of bone and cartilage -     Dexa Bone Density Study Axial ([id number]); Future 6. Encounter for screening mammogram for malignant neoplasm of breast 
-     Bilateral Digital Screening Mammography; Future 7. Acute left-sided low back pain with left-sided sciatica 8. Trochanteric bursitis, left hip 
 
9. Obesity (BMI 30.0-34. 9) Health Maintenance Due Topic Date Due  Bone Densitometry (Dexa) Screening  Ordered today  Pneumococcal 65+ Low/Medium Risk (2 of 2 - PPSV23) Prescription given  BREAST CANCER SCRN MAMMOGRAM  Ordered today  Influenza Age 5 to Adult  08/01/2018  MEDICARE YEARLY EXAM  09/19/2019 normal

## 2018-09-19 NOTE — MR AVS SNAPSHOT
[address] [place] [address] [address] [place] [address] 
[phone] Patient: Carey Pickering MRN: [id number] SWS:[id number] Visit Information Date & Time Provider Department Dept. Phone Encounter #  
 9/19/2018 11:20 AM Aubree Rivas MD [facility] [phone] [id number] Your Appointments 3/20/2019 11:20 AM  
ROUTINE CARE with Aubree Rivas MD  
[facility] [address]) Appt Note: R F/U  
 [place] [address] [facility] [address]  
[address] [facility] 121 [facility] 7 [id number] Upcoming Health Maintenance Date Due Bone Densitometry (Dexa) Screening 11/3/2012 Pneumococcal 65+ Low/Medium Risk (2 of 2 - PPSV23) 4/19/2017 BREAST CANCER SCRN MAMMOGRAM 1/14/2018 Influenza Age 5 to Adult 8/1/2018 MEDICARE YEARLY EXAM 9/16/2018 COLONOSCOPY 4/30/2019* GLAUCOMA SCREENING Q2Y 3/14/2020 DTaP/Tdap/Td series (2 - Td) 6/27/2021 *Topic was postponed. The date shown is not the original due date. Allergies as of 9/19/2018  Review Complete On: 9/19/2018 By: Aubree Rivas MD  
  
 Severity Noted Reaction Type Reactions Adhesive  08/14/2013    Rash RED AND PUFFY SKIN Celebrex [Celecoxib]  06/17/2011    Other (comments) INCREASED BP Lipitor [Atorvastatin]  05/20/2016    Myalgia Niaspan [Niacin]  06/17/2011    Other (comments) SEVERE REDNESS, BURNING OF ENTIRE BODY WITH CHILLS Current Immunizations  Reviewed on 3/9/2017 Name Date Influenza High Dose Vaccine PF 12/5/2017 Influenza Vaccine 10/30/2015, 12/2/2014 Pneumococcal Conjugate (PCV-13) 4/19/2016 Tdap 6/27/2011 Zoster Vaccine, Live 12/27/2013 Not reviewed this visit You Were Diagnosed With   
  
 Codes Comments Pure hypercholesterolemia    -  Primary ICD-10-CM: E78.00 ICD-9-CM: 272.0 Advance directive on file     ICD-10-CM: Z78.9 ICD-9-CM: V49.89 Postmenopausal     ICD-10-CM: Z78.0 ICD-9-CM: V49.81 Medicare annual wellness visit, subsequent     ICD-10-CM: Z00.00 ICD-9-CM: V70.0 Disorder of bone and cartilage     ICD-10-CM: M89.9, M94.9 ICD-9-CM: 733.90 Encounter for screening mammogram for malignant neoplasm of breast     ICD-10-CM: Z12.31 
ICD-9-CM: V76.12 Acute left-sided low back pain with left-sided sciatica     ICD-10-CM: M54.42 
ICD-9-CM: 724.2, 724.3 Trochanteric bursitis, left hip     ICD-10-CM: M70.62 ICD-9-CM: 726.5 Vitals BP Pulse Temp Resp Height(growth percentile) Weight(growth percentile) 118/76 77 98.6 °F (37 °C) (Oral) 16 5' 1\" (1.549 m) 168 lb (76.2 kg) SpO2 BMI OB Status Smoking Status 97% 31.74 kg/m2 Hysterectomy Former Smoker Vitals History BMI and BSA Data Body Mass Index Body Surface Area 31.74 kg/m 2 1.81 m 2 Preferred Pharmacy Pharmacy Name Phone [facility]  [address] [address] [phone] Your Updated Medication List  
  
   
This list is accurate as of 9/19/18 12:01 PM.  Always use your most recent med list.  
  
  
  
  
 ALPRAZolam 0.5 mg tablet Commonly known as:  [place] Take 0.5-1 Tabs by mouth two (2) times daily as needed for Anxiety. Max Daily Amount: 1 mg.  
  
 atorvastatin 20 mg tablet Commonly known as:  LIPITOR  
TAKE 1 TABLET EVERY DAY  
  
 CENTRUM SILVER WOMEN PO Take 1 Tab by mouth daily. estradiol 1 mg tablet Commonly known as:  ESTRACE Take 0.5 Tabs by mouth daily. furosemide 40 mg tablet Commonly known as:  LASIX TAKE ONE-HALF TO ONE TABLET BY MOUTH ONCE DAILY AS NEEDED (SWELLING) meloxicam 15 mg tablet Commonly known as:  MOBIC  
TAKE 1 TABLET DAILY (REPLACES DICLOFENAC)  
  
 pneumococcal 23-merna ps vaccine 25 mcg/0.5 mL injection Commonly known as:  PNEUMOVAX 23 0.5 mL by IntraMUSCular route once for 1 dose. varicella-zoster recombinant (PF) 50 mcg/0.5 mL Susr injection Commonly known as:  SHINGRIX (PF)  
0.5mL by IntraMUSCular route once now and then repeat in 2-6 months VITAMIN D3 1,000 unit tablet Generic drug:  cholecalciferol Take 1,000 Units by mouth daily. Prescriptions Printed Refills  
 pneumococcal 23-merna ps vaccine (PNEUMOVAX 23) 25 mcg/0.5 mL injection 0 Si.5 mL by IntraMUSCular route once for 1 dose. Class: Print Route: IntraMUSCular  
 varicella-zoster recombinant, PF, (SHINGRIX, PF,) 50 mcg/0.5 mL susr injection 1 Si.5mL by IntraMUSCular route once now and then repeat in 2-6 months Class: Print We Performed the Following HEPATIC FUNCTION PANEL [45472 CPT(R)] LIPID PANEL [07311 CPT(R)] To-Do List   
 2018 Imaging:  DEXA BONE DENSITY STUDY AXIAL   
  
 2018 Imaging:  RISHI MAMMO BI SCREENING INCL CAD Patient Instructions Preventing Falls: Care Instructions Your Care Instructions Getting around your home safely can be a challenge if you have injuries or health problems that make it easy for you to fall. Loose rugs and furniture in walkways are among the dangers for many older people who have problems walking or who have poor eyesight. People who have conditions such as arthritis, osteoporosis, or dementia also have to be careful not to fall. You can make your home safer with a few simple measures. Follow-up care is a key part of your treatment and safety. Be sure to make and go to all appointments, and call your doctor if you are having problems. It's also a good idea to know your test results and keep a list of the medicines you take. How can you care for yourself at home? Taking care of yourself · You may get dizzy if you do not drink enough water.  To prevent dehydration, drink plenty of fluids, enough so that your urine is light yellow or clear like water. Choose water and other caffeine-free clear liquids. If you have kidney, heart, or liver disease and have to limit fluids, talk with your doctor before you increase the amount of fluids you drink. · Exercise regularly to improve your strength, muscle tone, and balance. Walk if you can. Swimming may be a good choice if you cannot walk easily. · Have your vision and hearing checked each year or any time you notice a change. If you have trouble seeing and hearing, you might not be able to avoid objects and could lose your balance. · Know the side effects of the medicines you take. Ask your doctor or pharmacist whether the medicines you take can affect your balance. Sleeping pills or sedatives can affect your balance. · Limit the amount of alcohol you drink. Alcohol can impair your balance and other senses. · Ask your doctor whether calluses or corns on your feet need to be removed. If you wear loose-fitting shoes because of calluses or corns, you can lose your balance and fall. · Talk to your doctor if you have numbness in your feet. Preventing falls at home · Remove raised doorway thresholds, throw rugs, and clutter. Repair loose carpet or raised areas in the floor. · Move furniture and electrical cords to keep them out of walking paths. · Use nonskid floor wax, and wipe up spills right away, especially on ceramic tile floors. · If you use a walker or cane, put rubber tips on it. If you use crutches, clean the bottoms of them regularly with an abrasive pad, such as steel wool. · Keep your house well lit, especially [facility], and outside walkways. Use night-lights in areas such as hallways and bathrooms. Add extra light switches or use remote switches (such as switches that go on or off when you clap your hands) to make it easier to turn lights on if you have to get up during the night. · Install sturdy handrails on stairways. · Move items in your cabinets so that the things you use a lot are on the lower shelves (about waist level). · Keep a cordless phone and a flashlight with new batteries by your bed. If possible, put a phone in each of the main rooms of your house, or carry a cell phone in case you fall and cannot reach a phone. Or, you can wear a device around your neck or wrist. You push a button that sends a signal for help. · Wear low-heeled shoes that fit well and give your feet good support. Use footwear with nonskid soles. Check the heels and soles of your shoes for wear. Repair or replace worn heels or soles. · Do not wear socks without shoes on wood floors. · Walk on the grass when the sidewalks are slippery. If you live in an area that gets snow and ice in the winter, sprinkle salt on slippery steps and sidewalks. Preventing falls in the bath · Install grab bars and nonskid mats inside and outside your shower or tub and near the toilet and sinks. · Use shower chairs and bath benches. · Use a hand-held shower head that will allow you to sit while showering. · Get into a tub or shower by putting the weaker leg in first. Get out of a tub or shower with your strong side first. 
· Repair loose toilet seats and consider installing a raised toilet seat to make getting on and off the toilet easier. · Keep your bathroom door unlocked while you are in the shower. Where can you learn more? Go to http://phill-abner.info/. Enter [id number] in the search box to learn more about \"Preventing Falls: Care Instructions. \" Current as of: May 12, 2017 Content Version: 11.7 © 7158-2596 [employer]. Care instructions adapted under license by [employer] (which disclaims liability or warranty for this information).  If you have questions about a medical condition or this instruction, always ask your healthcare professional. [employer] disclaims any warranty or liability for your use of this information. Medicare Wellness Visit, Female The best way to live healthy is to have a lifestyle where you eat a well-balanced diet, exercise regularly, limit alcohol use, and quit all forms of tobacco/nicotine, if applicable. Regular preventive services are another way to keep healthy. Preventive services (vaccines, screening tests, monitoring & exams) can help personalize your care plan, which helps you manage your own care. Screening tests can find health problems at the earliest stages, when they are easiest to treat. [facility] follows the current, evidence-based guidelines published by the [facility] States [place] (USPSTF) when recommending preventive services for our patients. Because we follow these guidelines, sometimes recommendations change over time as research supports it. (For example, mammograms used to be recommended annually. Even though Medicare will still pay for an annual mammogram, the newer guidelines recommend a mammogram every two years for women of average risk.) Of course, you and your doctor may decide to screen more often for some diseases, based on your risk and your health status. Preventive services for you include: - Medicare offers their members a free annual wellness visit, which is time for you and your primary care provider to discuss and plan for your preventive service needs. Take advantage of this benefit every year! 
-All adults over the age of 72 should receive the recommended pneumonia vaccines. Current USPSTF guidelines recommend a series of two vaccines for the best pneumonia protection.  
-All adults should have a flu vaccine yearly and a tetanus vaccine every 10 years.  All adults age 61 and older should receive a shingles vaccine once in their lifetime.   
-A bone mass density test is recommended when a woman turns 65 to screen for osteoporosis. This test is only recommended one time, as a screening. Some providers will use this same test as a disease monitoring tool if you already have osteoporosis. -All adults age 38-68 who are overweight should have a diabetes screening test once every three years.  
-Other screening tests and preventive services for persons with diabetes include: an eye exam to screen for diabetic retinopathy, a kidney function test, a foot exam, and stricter control over your cholesterol.  
-Cardiovascular screening for adults with routine risk involves an electrocardiogram (ECG) at intervals determined by your doctor.  
-Colorectal cancer screenings should be done for adults age 54-65 with no increased risk factors for colorectal cancer. There are a number of acceptable methods of screening for this type of cancer. Each test has its own benefits and drawbacks. Discuss with your doctor what is most appropriate for you during your annual wellness visit. The different tests include: colonoscopy (considered the best screening method), a fecal occult blood test, a fecal DNA test, and sigmoidoscopy. -Breast cancer screenings are recommended every other year for women of normal risk, age 54-69. 
-Cervical cancer screenings for women over age 72 are only recommended with certain risk factors.  
-All adults born between [facility] should be screened once for Hepatitis C. Here is a list of your current Health Maintenance items (your personalized list of preventive services) with a due date: 
 
Topic Date Due  Bone Densitometry (Dexa) Screening  Ordered today  Pneumococcal 65+ Low/Medium Risk (2 of 2 - PPSV23) Prescription given  BREAST CANCER SCRN MAMMOGRAM  Ordered today  Influenza Age 5 to Adult  08/01/2018  MEDICARE YEARLY EXAM  09/19/2019 Introducing [facility]! Dear [facility]: 
Thank you for requesting a [employer] account.   Our records indicate that you already have an active [employer] account. You can access your account anytime at [email or web address] Did you know that you can access your hospital and ER discharge instructions at any time in [employer]? You can also review all of your test results from your hospital stay or ER visit. Additional Information If you have questions, please visit the Frequently Asked Questions section of the [employer] website at [email or web address] Remember, [employer] is NOT to be used for urgent needs. For medical emergencies, dial 911. Now available from your iPhone and Android! Please provide this summary of care documentation to your next provider. Your primary care clinician is listed as LISA ESQUIVEL. If you have any questions after today's visit, please call [phone].

## 2018-09-19 NOTE — PROGRESS NOTES
All health maintenance and other pertinent information has been reviewed in preparation for today's office visit. Patient presents in the office today for: Chief Complaint Patient presents with  Follow-up Hyperlipidemia, Depression 1. Have you been to the ER, urgent care clinic since your last visit? Hospitalized since your last visit? No 
 
2. Have you seen or consulted any other health care providers outside of the [address] [place] [address] since your last visit? Include any pap smears or colon screening.  No

## 2018-09-19 NOTE — PATIENT INSTRUCTIONS
Preventing Falls: Care Instructions Your Care Instructions Getting around your home safely can be a challenge if you have injuries or health problems that make it easy for you to fall. Loose rugs and furniture in walkways are among the dangers for many older people who have problems walking or who have poor eyesight. People who have conditions such as arthritis, osteoporosis, or dementia also have to be careful not to fall. You can make your home safer with a few simple measures. Follow-up care is a key part of your treatment and safety. Be sure to make and go to all appointments, and call your doctor if you are having problems. It's also a good idea to know your test results and keep a list of the medicines you take. How can you care for yourself at home? Taking care of yourself · You may get dizzy if you do not drink enough water. To prevent dehydration, drink plenty of fluids, enough so that your urine is light yellow or clear like water. Choose water and other caffeine-free clear liquids. If you have kidney, heart, or liver disease and have to limit fluids, talk with your doctor before you increase the amount of fluids you drink. · Exercise regularly to improve your strength, muscle tone, and balance. Walk if you can. Swimming may be a good choice if you cannot walk easily. · Have your vision and hearing checked each year or any time you notice a change. If you have trouble seeing and hearing, you might not be able to avoid objects and could lose your balance. · Know the side effects of the medicines you take. Ask your doctor or pharmacist whether the medicines you take can affect your balance. Sleeping pills or sedatives can affect your balance. · Limit the amount of alcohol you drink. Alcohol can impair your balance and other senses. · Ask your doctor whether calluses or corns on your feet need to be removed.  If you wear loose-fitting shoes because of calluses or corns, you can lose your balance and fall. · Talk to your doctor if you have numbness in your feet. Preventing falls at home · Remove raised doorway thresholds, throw rugs, and clutter. Repair loose carpet or raised areas in the floor. · Move furniture and electrical cords to keep them out of walking paths. · Use nonskid floor wax, and wipe up spills right away, especially on ceramic tile floors. · If you use a walker or cane, put rubber tips on it. If you use crutches, clean the bottoms of them regularly with an abrasive pad, such as steel wool. · Keep your house well lit, especially [employer], and outside walkways. Use night-lights in areas such as hallways and bathrooms. Add extra light switches or use remote switches (such as switches that go on or off when you clap your hands) to make it easier to turn lights on if you have to get up during the night. · Install sturdy handrails on stairways. · Move items in your cabinets so that the things you use a lot are on the lower shelves (about waist level). · Keep a cordless phone and a flashlight with new batteries by your bed. If possible, put a phone in each of the main rooms of your house, or carry a cell phone in case you fall and cannot reach a phone. Or, you can wear a device around your neck or wrist. You push a button that sends a signal for help. · Wear low-heeled shoes that fit well and give your feet good support. Use footwear with nonskid soles. Check the heels and soles of your shoes for wear. Repair or replace worn heels or soles. · Do not wear socks without shoes on wood floors. · Walk on the grass when the sidewalks are slippery. If you live in an area that gets snow and ice in the winter, sprinkle salt on slippery steps and sidewalks. Preventing falls in the bath · Install grab bars and nonskid mats inside and outside your shower or tub and near the toilet and sinks. · Use shower chairs and bath benches. · Use a hand-held shower head that will allow you to sit while showering. · Get into a tub or shower by putting the weaker leg in first. Get out of a tub or shower with your strong side first. 
· Repair loose toilet seats and consider installing a raised toilet seat to make getting on and off the toilet easier. · Keep your bathroom door unlocked while you are in the shower. Where can you learn more? Go to http://phill-abner.info/. Enter [id number] in the search box to learn more about \"Preventing Falls: Care Instructions. \" Current as of: May 12, 2017 Content Version: 11.7 © 7148-1147 [employer]. Care instructions adapted under license by [employer] (which disclaims liability or warranty for this information). If you have questions about a medical condition or this instruction, always ask your healthcare professional. [address] any warranty or liability for your use of this information. Medicare Wellness Visit, Female The best way to live healthy is to have a lifestyle where you eat a well-balanced diet, exercise regularly, limit alcohol use, and quit all forms of tobacco/nicotine, if applicable. Regular preventive services are another way to keep healthy. Preventive services (vaccines, screening tests, monitoring & exams) can help personalize your care plan, which helps you manage your own care. Screening tests can find health problems at the earliest stages, when they are easiest to treat. [facility] follows the current, evidence-based guidelines published by the [facility] [place] (USPSTF) when recommending preventive services for our patients. Because we follow these guidelines, sometimes recommendations change over time as research supports it. (For example, mammograms used to be recommended annually.  Even though Medicare will still pay for an annual mammogram, the newer guidelines recommend a mammogram every two years for women of average risk.) Of course, you and your doctor may decide to screen more often for some diseases, based on your risk and your health status. Preventive services for you include: - Medicare offers their members a free annual wellness visit, which is time for you and your primary care provider to discuss and plan for your preventive service needs. Take advantage of this benefit every year! 
-All adults over the age of 72 should receive the recommended pneumonia vaccines. Current USPSTF guidelines recommend a series of two vaccines for the best pneumonia protection.  
-All adults should have a flu vaccine yearly and a tetanus vaccine every 10 years. All adults age 61 and older should receive a shingles vaccine once in their lifetime.   
-A bone mass density test is recommended when a woman turns 65 to screen for osteoporosis. This test is only recommended one time, as a screening. Some providers will use this same test as a disease monitoring tool if you already have osteoporosis. -All adults age 38-68 who are overweight should have a diabetes screening test once every three years.  
-Other screening tests and preventive services for persons with diabetes include: an eye exam to screen for diabetic retinopathy, a kidney function test, a foot exam, and stricter control over your cholesterol.  
-Cardiovascular screening for adults with routine risk involves an electrocardiogram (ECG) at intervals determined by your doctor.  
-Colorectal cancer screenings should be done for adults age 54-65 with no increased risk factors for colorectal cancer. There are a number of acceptable methods of screening for this type of cancer. Each test has its own benefits and drawbacks. Discuss with your doctor what is most appropriate for you during your annual wellness visit.  The different tests include: colonoscopy (considered the best screening method), a fecal occult blood test, a fecal DNA test, and sigmoidoscopy. -Breast cancer screenings are recommended every other year for women of normal risk, age 54-69. 
-Cervical cancer screenings for women over age 72 are only recommended with certain risk factors.  
-All adults born between [facility] should be screened once for Hepatitis C. Here is a list of your current Health Maintenance items (your personalized list of preventive services) with a due date: 
 
Topic Date Due  Bone Densitometry (Dexa) Screening  Ordered today  Pneumococcal 65+ Low/Medium Risk (2 of 2 - PPSV23) Prescription given  BREAST CANCER SCRN MAMMOGRAM  Ordered today  Influenza Age 5 to Adult  08/01/2018  MEDICARE YEARLY EXAM  09/19/2019

## 2018-09-20 LAB
ALBUMIN SERPL-MCNC: 4.5 G/DL (ref 3.5–4.8)
ALP SERPL-CCNC: 73 IU/L (ref 39–117)
ALT SERPL-CCNC: 28 IU/L (ref 0–32)
AST SERPL-CCNC: 35 IU/L (ref 0–40)
BILIRUB DIRECT SERPL-MCNC: 0.17 MG/DL (ref 0–0.4)
BILIRUB SERPL-MCNC: 0.7 MG/DL (ref 0–1.2)
CHOLEST SERPL-MCNC: 187 MG/DL (ref 100–199)
HDLC SERPL-MCNC: 61 MG/DL
INTERPRETATION, 910389: NORMAL
LDLC SERPL CALC-MCNC: 85 MG/DL (ref 0–99)
PROT SERPL-MCNC: 7 G/DL (ref 6–8.5)
TRIGL SERPL-MCNC: 206 MG/DL (ref 0–149)
VLDLC SERPL CALC-MCNC: 41 MG/DL (ref 5–40)

## 2018-10-24 ENCOUNTER — HOSPITAL ENCOUNTER (OUTPATIENT)
Dept: MAMMOGRAPHY | Age: 73
Discharge: HOME | End: 2018-10-24
Attending: INTERNAL MEDICINE
Payer: MEDICARE

## 2018-10-24 DIAGNOSIS — Z78.0 POSTMENOPAUSAL: ICD-10-CM

## 2018-10-24 DIAGNOSIS — M89.9 DISORDER OF BONE AND CARTILAGE: ICD-10-CM

## 2018-10-24 DIAGNOSIS — Z12.31 ENCOUNTER FOR SCREENING MAMMOGRAM FOR MALIGNANT NEOPLASM OF BREAST: ICD-10-CM

## 2018-10-24 DIAGNOSIS — M94.9 DISORDER OF BONE AND CARTILAGE: ICD-10-CM

## 2018-10-24 PROCEDURE — 77080 DXA BONE DENSITY AXIAL: CPT

## 2018-10-24 PROCEDURE — 77067 SCR MAMMO BI INCL CAD: CPT

## 2018-10-28 RX ORDER — MELOXICAM 15 MG/1
TABLET ORAL
Qty: 90 TAB | Refills: 1 | Status: SHIPPED | OUTPATIENT
Start: 2018-10-28 | End: 2019-03-21 | Stop reason: SDUPTHER

## 2019-03-20 ENCOUNTER — OFFICE VISIT (OUTPATIENT)
Dept: INTERNAL MEDICINE CLINIC | Age: 74
End: 2019-03-20

## 2019-03-20 ENCOUNTER — HOSPITAL ENCOUNTER (OUTPATIENT)
Dept: LAB | Age: 74
Discharge: HOME | End: 2019-03-20
Payer: MEDICARE

## 2019-03-20 VITALS
TEMPERATURE: 98.6 F | SYSTOLIC BLOOD PRESSURE: 136 MMHG | HEIGHT: 61 IN | HEART RATE: 101 BPM | DIASTOLIC BLOOD PRESSURE: 92 MMHG | BODY MASS INDEX: 28.89 KG/M2 | OXYGEN SATURATION: 99 % | WEIGHT: 153 LBS

## 2019-03-20 DIAGNOSIS — F33.1 MODERATE EPISODE OF RECURRENT MAJOR DEPRESSIVE DISORDER (HCC): ICD-10-CM

## 2019-03-20 DIAGNOSIS — E66.3 OVERWEIGHT (BMI 25.0-29.9): ICD-10-CM

## 2019-03-20 DIAGNOSIS — F43.21 GRIEF REACTION: ICD-10-CM

## 2019-03-20 DIAGNOSIS — E78.00 PURE HYPERCHOLESTEROLEMIA: Primary | ICD-10-CM

## 2019-03-20 DIAGNOSIS — M17.0 BILATERAL PRIMARY OSTEOARTHRITIS OF KNEE: ICD-10-CM

## 2019-03-20 DIAGNOSIS — E55.9 VITAMIN D DEFICIENCY: ICD-10-CM

## 2019-03-20 PROCEDURE — 82306 VITAMIN D 25 HYDROXY: CPT

## 2019-03-20 PROCEDURE — 80061 LIPID PANEL: CPT

## 2019-03-20 PROCEDURE — 36415 COLL VENOUS BLD VENIPUNCTURE: CPT

## 2019-03-20 PROCEDURE — 80053 COMPREHEN METABOLIC PANEL: CPT

## 2019-03-20 NOTE — PROGRESS NOTES
Subjective: Lissy Gary is a 72 y.o. female who is seen for follow up of hyperlipidemia. Cardiovascular risk analysis - 72 y.o. female LDL goal is under 130. Compliance with treatment thus far has been good. ROS: taking medications as instructed, no medication side effects noted, no TIA's, no chest pain on exertion, no dyspnea on exertion, noting swelling of ankle - worse in last month, no orthostatic dizziness or lightheadedness, no palpitations. New concerns:  
Son  in December. Still working through grief. Taking Alprazolam as needed at night to sleep. Hasn't needed lately. Living at [facility], daughter is nearby. She has lost weight. Increased pain and swelling bilat knees. Current Outpatient Medications Medication Sig Dispense Refill  meloxicam (MOBIC) 15 mg tablet TAKE 1 TABLET DAILY (REPLACES DICLOFENAC) 90 Tab 1  
 estradiol (ESTRACE) 1 mg tablet Take 0.5 Tabs by mouth daily. 45 Tab 3  
 atorvastatin (LIPITOR) 20 mg tablet TAKE 1 TABLET EVERY DAY 90 Tab 3  ALPRAZolam (XANAX) 0.5 mg tablet Take 0.5-1 Tabs by mouth two (2) times daily as needed for Anxiety. Max Daily Amount: 1 mg. 90 Tab 0  MULTIVITS-MIN/IRON/FA/LUTEIN (CENTRUM SILVER WOMEN PO) Take 1 Tab by mouth daily.  furosemide (LASIX) 40 mg tablet TAKE ONE-HALF TO ONE TABLET BY MOUTH ONCE DAILY AS NEEDED (SWELLING) (Patient taking differently: Taking 1 every other day as needed.) 30 Tab 5  cholecalciferol (VITAMIN D3) 1,000 unit tablet Take 1,000 Units by mouth daily.  varicella-zoster recombinant, PF, (SHINGRIX, PF,) 50 mcg/0.5 mL susr injection 0.5mL by IntraMUSCular route once now and then repeat in 2-6 months 0.5 mL 1 Lab Results Component Value Date/Time Cholesterol, total 187 2018 01:33 PM  
 HDL Cholesterol 61 2018 01:33 PM  
 LDL, calculated 85 2018 01:33 PM  
 Triglyceride 206 (H) 2018 01:33 PM  
 
Lab Results Component Value Date/Time ALT (SGPT) 28 09/19/2018 01:33 PM  
 AST (SGOT) 35 09/19/2018 01:33 PM  
 Alk. phosphatase 73 09/19/2018 01:33 PM  
 Bilirubin, direct 0.17 09/19/2018 01:33 PM  
 Bilirubin, total 0.7 09/19/2018 01:33 PM  
 Albumin 4.5 09/19/2018 01:33 PM  
 Protein, total 7.0 09/19/2018 01:33 PM  
 INR 1.0 08/14/2013 10:57 AM  
 Prothrombin time 10.3 08/14/2013 10:57 AM  
 PLATELET 542 18/48/7031 12:55 PM  
 Hepatitis B surface Ag 0.12 08/28/2013 03:00 PM  
  
 
Objective:  
 
Visit Vitals BP (!) 136/92 Pulse (!) 101 Temp 98.6 °F (37 °C) (Oral) Ht 5' 1\" (1.549 m) Wt 153 lb (69.4 kg) SpO2 99% BMI 28.91 kg/m² Appearance: alert, well appearing, and in no distress and oriented to person, place, and time. CVS exam  
 
NECK: supple, no lad, no bruit, no tm LUNGS: cta bilat CV rrr, no m/g/r ABD: soft, nt, nd, nabs EXT: no c/c/e Assessment/Plan: Hyperlipidemia well controlled. current treatment plan is effective, no change in therapy. Check labs Borderline blood pressure - usually better than this but pt is upset in office today She will follow Grief reaction - secondary to death of son Feels not worsening of her underlying depression Continue same meds Overweight - loss of weight recently secondary to grief Encouraged healthy eating and regular exercise 
 
bilat OA knees - continue mobic Vit D def - continue supplements, repeat labs. Orders Placed This Encounter  METABOLIC PANEL, COMPREHENSIVE  LIPID PANEL  
 VITAMIN D, 25 HYDROXY Follow-up and Dispositions · Return in about 6 months (around 9/20/2019) for hyperlipidemia. Sandra Munoz

## 2019-03-21 ENCOUNTER — TELEPHONE (OUTPATIENT)
Dept: INTERNAL MEDICINE CLINIC | Age: 74
End: 2019-03-21

## 2019-03-21 LAB
25(OH)D3+25(OH)D2 SERPL-MCNC: 65 NG/ML (ref 30–100)
ALBUMIN SERPL-MCNC: 4.6 G/DL (ref 3.5–4.8)
ALBUMIN/GLOB SERPL: 2 {RATIO} (ref 1.2–2.2)
ALP SERPL-CCNC: 61 IU/L (ref 39–117)
ALT SERPL-CCNC: 23 IU/L (ref 0–32)
AST SERPL-CCNC: 31 IU/L (ref 0–40)
BILIRUB SERPL-MCNC: 0.9 MG/DL (ref 0–1.2)
BUN SERPL-MCNC: 17 MG/DL (ref 8–27)
BUN/CREAT SERPL: 19 (ref 12–28)
CALCIUM SERPL-MCNC: 9.8 MG/DL (ref 8.7–10.3)
CHLORIDE SERPL-SCNC: 102 MMOL/L (ref 96–106)
CHOLEST SERPL-MCNC: 175 MG/DL (ref 100–199)
CO2 SERPL-SCNC: 26 MMOL/L (ref 20–29)
CREAT SERPL-MCNC: 0.89 MG/DL (ref 0.57–1)
GLOBULIN SER CALC-MCNC: 2.3 G/DL (ref 1.5–4.5)
GLUCOSE SERPL-MCNC: 82 MG/DL (ref 65–99)
HDLC SERPL-MCNC: 72 MG/DL
INTERPRETATION, 910389: NORMAL
LDLC SERPL CALC-MCNC: 85 MG/DL (ref 0–99)
POTASSIUM SERPL-SCNC: 3.7 MMOL/L (ref 3.5–5.2)
PROT SERPL-MCNC: 6.9 G/DL (ref 6–8.5)
SODIUM SERPL-SCNC: 145 MMOL/L (ref 134–144)
TRIGL SERPL-MCNC: 91 MG/DL (ref 0–149)
VLDLC SERPL CALC-MCNC: 18 MG/DL (ref 5–40)

## 2019-03-21 RX ORDER — MELOXICAM 15 MG/1
TABLET ORAL
Qty: 90 TAB | Refills: 1 | Status: SHIPPED | OUTPATIENT
Start: 2019-03-21 | End: 2019-12-16 | Stop reason: SDUPTHER

## 2019-03-21 RX ORDER — FUROSEMIDE 40 MG/1
TABLET ORAL
Qty: 90 TAB | Refills: 3 | Status: SHIPPED | OUTPATIENT
Start: 2019-03-21 | End: 2020-09-17 | Stop reason: SDUPTHER

## 2019-03-21 RX ORDER — ATORVASTATIN CALCIUM 20 MG/1
20 TABLET, FILM COATED ORAL DAILY
Qty: 90 TAB | Refills: 3 | Status: SHIPPED | OUTPATIENT
Start: 2019-03-21 | End: 2020-03-17

## 2019-03-21 NOTE — TELEPHONE ENCOUNTER
Regarding: FW: Prescription Question  Contact: [phone]      ----- Message -----  From: Johanne Young LPN  Sent: 0/28/0991  10:40 AM  To: Mamta Gupta LPN  Subject: FW: Prescription Question                            ----- Message -----  From: Earl Sabas  Sent: 3/21/2019  10:39 AM  To: [facility] Nurse Pool  Subject: Prescription Question                            ----- Message from [address] [place] [address] 951, Generic sent at 3/21/2019 10:39 AM EDT -----    I guess I got off track yesterday and neglected to talk to you about my prescriptions. I changed insurance and also have a new Rx drug plan. The new drug plan is [facility] and the mail order is through [address] [place] [address]. They made a copy of the card so it should be in the system. I need 90 days supplies of Atorvastatin and Meloxicam with one refill. I also need a new Rx for furosemide 40 mg. as needed for swelling. I still have plenty of alprazolam. If you are unable to get the information on [facility], just let me know and I will send it to you. Also, do I need a written Rx for the shingles shots? Im sure you gave it to me, but heaven knows what I did with it. Sorry. ..     Thanks,    Felisa Espinoza

## 2019-06-06 ENCOUNTER — TELEPHONE (OUTPATIENT)
Dept: INTERNAL MEDICINE CLINIC | Age: 74
End: 2019-06-06

## 2019-06-06 RX ORDER — ALBUTEROL SULFATE 90 UG/1
2 AEROSOL, METERED RESPIRATORY (INHALATION)
Qty: 1 INHALER | Refills: 1 | Status: SHIPPED | OUTPATIENT
Start: 2019-06-06 | End: 2019-09-18 | Stop reason: ALTCHOICE

## 2019-06-06 NOTE — TELEPHONE ENCOUNTER
Regarding: FW: Non-Urgent Medical Question  Contact: [phone]      ----- Message -----  From: Corina Benitez LPN  Sent: 8/5/2210   7:33 AM  To: Gus Cortes LPN  Subject: FW: Non-Urgent Medical Question                      ----- Message -----  From: Jocy Echols  Sent: 6/5/2019   7:06 PM  To: Mariajose Mtz [facility]  Subject: Non-Urgent Medical Question                      ----- Message from [address] [place] [address] [address], [facility] sent at 6/5/2019  7:06 PM EDT -----    Dr. Markus Howe,  I am on a second viral infection. I started the first one at [facility] and it lasted for 3 weeks. I treated myself with 1000 mgs of vitamin C and Claritin D. I was over that for about 2 weeks and was exposed to this infection about a week ago. I have again been taking Claritin D and vitamin C. I have a bad cough productive of clear sputum and a lot of wheezing. Nasal secretions are also clear. I have no temp. Would it be possible for you to send a prescription for an albuterol inhaler to the [address] [place] [address]? I cant think of anything else that would help. I am pretty sure this is viral and not bacterial so antibiotics wont help. Thanks for your help.   Kathy Rocha

## 2019-06-14 ENCOUNTER — TELEPHONE (OUTPATIENT)
Dept: INTERNAL MEDICINE CLINIC | Age: 74
End: 2019-06-14

## 2019-06-14 RX ORDER — AZITHROMYCIN 250 MG/1
250 TABLET, FILM COATED ORAL SEE ADMIN INSTRUCTIONS
Qty: 6 TAB | Refills: 0 | Status: SHIPPED | OUTPATIENT
Start: 2019-06-14 | End: 2019-06-19

## 2019-06-14 NOTE — TELEPHONE ENCOUNTER
Regarding: FW: Non-Urgent Medical Question  Contact: [phone]      ----- Message -----  From: Vicki Small  Sent: 6/13/2019  12:10 PM  To: Edilberto Dempsey  Subject: Non-Urgent Medical Question                      ----- Message from [address] [place] [address] [address], Generic sent at 6/13/2019 12:10 PM EDT -----    Dr. Magi Thomas,    I am still coughing, but now it has turned to a dark green. I had to stop the Mucinex as I broke out in hives. The only thing I am taking is a regular Claritin which seems to have cleared the hives and 5000 mg of Vit C. The inhaler was too expensive, so I ordered a nebulizer and used the albuterol that I used in my sons nebulizer. Nothing seems to be helping that much. The epi in the albuterol made me feel awful so I havent used it anymore. I wheeze off and on, but I really dont like the color of my sputum. It almost has a brownish tinge to it. This has been going on for two weeks now. Would you be willing to send me an antibiotic? I am still not running a fever.

## 2019-07-29 ENCOUNTER — TELEPHONE (OUTPATIENT)
Dept: INTERNAL MEDICINE CLINIC | Age: 74
End: 2019-07-29

## 2019-07-29 RX ORDER — ESTRADIOL 0.5 MG/1
0.5 TABLET ORAL DAILY
Qty: 30 TAB | Refills: 11 | Status: SHIPPED | OUTPATIENT
Start: 2019-07-29 | End: 2019-09-23 | Stop reason: DRUGHIGH

## 2019-07-29 NOTE — TELEPHONE ENCOUNTER
----- Message from Roz Weiner LPN sent at 4/23/1156 11:56 AM EDT -----  Regarding: FW: Non-Urgent Medical Question  Contact: [phone]      ----- Message -----  From: Jenniferenia Clubs  Sent: 7/29/2019  11:50 AM EDT  To: Cecilio Dempsey  Subject: Non-Urgent Medical Question                      Dr. Sonja Van,  I stopped taking the estradiol about a week after seeing you in March. I am absolutely miserable. I thought the night sweats and hot flashes would dissipate over time, but they havent. Would you be ok with sending a script to my mail order pharmacy for estradiol 1 mg. daily? Im really tired of feeling bad.   Gisella Nichols

## 2019-09-18 ENCOUNTER — OFFICE VISIT (OUTPATIENT)
Dept: INTERNAL MEDICINE CLINIC | Age: 74
End: 2019-09-18

## 2019-09-18 ENCOUNTER — HOSPITAL ENCOUNTER (OUTPATIENT)
Dept: LAB | Age: 74
Discharge: HOME | End: 2019-09-18
Payer: MEDICARE

## 2019-09-18 VITALS
DIASTOLIC BLOOD PRESSURE: 76 MMHG | OXYGEN SATURATION: 97 % | HEIGHT: 61 IN | WEIGHT: 157 LBS | TEMPERATURE: 98.5 F | HEART RATE: 72 BPM | BODY MASS INDEX: 29.64 KG/M2 | SYSTOLIC BLOOD PRESSURE: 137 MMHG

## 2019-09-18 DIAGNOSIS — E04.1 THYROID NODULE: ICD-10-CM

## 2019-09-18 DIAGNOSIS — Z71.89 ADVANCE DIRECTIVE DISCUSSED WITH PATIENT: ICD-10-CM

## 2019-09-18 DIAGNOSIS — E55.9 VITAMIN D DEFICIENCY: ICD-10-CM

## 2019-09-18 DIAGNOSIS — E78.00 PURE HYPERCHOLESTEROLEMIA: Primary | ICD-10-CM

## 2019-09-18 DIAGNOSIS — F43.21 GRIEF REACTION: ICD-10-CM

## 2019-09-18 DIAGNOSIS — Z00.00 MEDICARE ANNUAL WELLNESS VISIT, SUBSEQUENT: ICD-10-CM

## 2019-09-18 DIAGNOSIS — M17.0 PRIMARY OSTEOARTHRITIS OF BOTH KNEES: ICD-10-CM

## 2019-09-18 DIAGNOSIS — E66.3 OVERWEIGHT (BMI 25.0-29.9): ICD-10-CM

## 2019-09-18 PROCEDURE — 36415 COLL VENOUS BLD VENIPUNCTURE: CPT

## 2019-09-18 PROCEDURE — 82306 VITAMIN D 25 HYDROXY: CPT

## 2019-09-18 PROCEDURE — 80053 COMPREHEN METABOLIC PANEL: CPT

## 2019-09-18 PROCEDURE — 80061 LIPID PANEL: CPT

## 2019-09-18 PROCEDURE — 84443 ASSAY THYROID STIM HORMONE: CPT

## 2019-09-18 RX ORDER — ASCORBIC ACID 500 MG
TABLET ORAL DAILY
COMMUNITY
End: 2021-06-21 | Stop reason: ALTCHOICE

## 2019-09-18 NOTE — PATIENT INSTRUCTIONS
Medicare Wellness Visit, Female The best way to live healthy is to have a lifestyle where you eat a well-balanced diet, exercise regularly, limit alcohol use, and quit all forms of tobacco/nicotine, if applicable. Regular preventive services are another way to keep healthy. Preventive services (vaccines, screening tests, monitoring & exams) can help personalize your care plan, which helps you manage your own care. Screening tests can find health problems at the earliest stages, when they are easiest to treat. [facility] follows the current, evidence-based guidelines published by the [facility] ([facility]) when recommending preventive services for our patients. Because we follow these guidelines, sometimes recommendations change over time as research supports it. (For example, mammograms used to be recommended annually. Even though Medicare will still pay for an annual mammogram, the newer guidelines recommend a mammogram every two years for women of average risk.) Of course, you and your doctor may decide to screen more often for some diseases, based on your risk and your health status. Preventive services for you include: - Medicare offers their members a free annual wellness visit, which is time for you and your primary care provider to discuss and plan for your preventive service needs. Take advantage of this benefit every year! 
-All adults over the age of 72 should receive the recommended pneumonia vaccines. Current USPSTF guidelines recommend a series of two vaccines for the best pneumonia protection.  
-All adults should have a flu vaccine yearly and a tetanus vaccine every 10 years. All adults age 61 and older should receive a shingles vaccine once in their lifetime.   
-A bone mass density test is recommended when a woman turns 65 to screen for osteoporosis. This test is only recommended one time, as a screening. Some providers will use this same test as a disease monitoring tool if you already have osteoporosis. -All adults age 38-68 who are overweight should have a diabetes screening test once every three years.  
-Other screening tests and preventive services for persons with diabetes include: an eye exam to screen for diabetic retinopathy, a kidney function test, a foot exam, and stricter control over your cholesterol.  
-Cardiovascular screening for adults with routine risk involves an electrocardiogram (ECG) at intervals determined by your doctor.  
-Colorectal cancer screenings should be done for adults age 54-65 with no increased risk factors for colorectal cancer. There are a number of acceptable methods of screening for this type of cancer. Each test has its own benefits and drawbacks. Discuss with your doctor what is most appropriate for you during your annual wellness visit. The different tests include: colonoscopy (considered the best screening method), a fecal occult blood test, a fecal DNA test, and sigmoidoscopy. -Breast cancer screenings are recommended every other year for women of normal risk, age 54-69. 
-Cervical cancer screenings for women over age 72 are only recommended with certain risk factors.  
-All adults born between [facility] should be screened once for Hepatitis C. Here is a list of your current Health Maintenance items (your personalized list of preventive services) with a due date: 
 
Health Maintenance Due Topic Date Due  
 COLONOSCOPY  Overdue - pt declines  Shingrix Vaccine Age 50> (1 of 2) Prescription given  Pneumococcal 65+ years (2 of 2 - PPSV23) Prescription given  Influenza Age 5 to Adult  08/01/2019  MEDICARE YEARLY EXAM  09/18/2020

## 2019-09-18 NOTE — PROGRESS NOTES
Chief Complaint   Patient presents with    Follow-up     hyperlipidemia       Visit Vitals  /76   Pulse 72   Temp 98.5 °F (36.9 °C) (Oral)   Ht 5' 1\" (1.549 m)   Wt 157 lb (71.2 kg)   SpO2 97%   BMI 29.66 kg/m²     1. Have you been to the ER, urgent care clinic since your last visit? Hospitalized since your last visit? no    2. Have you seen or consulted any other health care providers outside of the [address] [place] [address] since your last visit? Include any pap smears or colon screening.  no

## 2019-09-18 NOTE — PROGRESS NOTES
Subjective: Brigid Andrews is a 72 y.o. female who is seen for follow up of hyperlipidemia. Cardiovascular risk analysis - 72 y.o. female LDL goal is under 130. Compliance with treatment thus far has been good. ROS: taking medications as instructed, no medication side effects noted, no TIA's, no chest pain on exertion, no dyspnea on exertion, noting swelling of ankles, no orthostatic dizziness or lightheadedness, no palpitations. New concerns:   Borderline blood pressure  Grief reaction taking her xanax approx once a night when she is has several nights she cannot sleep. Feels she is doing ok with her grief. Still weepy at times. Started a new job at [employer] part time. Gives her a free membership. Getting on the treadmill 3-4 days a week . OA knees. Had gel injections which did not help. On Monday was Dr. She Gonzalez and had steroid injections to knees and hips. Recommended  for a few sessions and then adjusting exercise. .  After working a 7-8 hour shift or driving, feet and ankle swell. Taking Lasix prn. HRT - over years she has weaned herself form 2mg of Estradiol to 0.5mg and did well. Stopped and was miserable. Having 20 hot flashes per day. Went back on the 0.5mg approx 6 weeks ago. Better but not great. She would like to go back to the 1mg dose for the next 1-2 months and then try to wean slowly back to the 0.5mg every day. Current Outpatient Medications   Medication Sig Dispense Refill    ascorbic acid, vitamin C, (VITAMIN C) 500 mg tablet Take  by mouth two (2) times a day.  cartilage/collagen II/hyaluron (MOVE FREE ULTRA PO) Take  by mouth. 1 tablet by mouth daily      estradiol (ESTRACE) 0.5 mg tablet Take 1 Tab by mouth daily. 30 Tab 11    atorvastatin (LIPITOR) 20 mg tablet Take 1 Tab by mouth daily.  90 Tab 3    meloxicam (MOBIC) 15 mg tablet TAKE 1 TABLET DAILY (REPLACES DICLOFENAC) 90 Tab 1    furosemide (LASIX) 40 mg tablet TAKE ONE-HALF TO ONE TABLET BY MOUTH ONCE DAILY AS NEEDED (SWELLING) 90 Tab 3    ALPRAZolam (XANAX) 0.5 mg tablet Take 0.5-1 Tabs by mouth two (2) times daily as needed for Anxiety. Max Daily Amount: 1 mg. 90 Tab 0    MULTIVITS-MIN/IRON/FA/LUTEIN (CENTRUM SILVER WOMEN PO) Take 1 Tab by mouth daily.  cholecalciferol (VITAMIN D3) 1,000 unit tablet Take 1,000 Units by mouth daily.  varicella-zoster recombinant, PF, (SHINGRIX, PF,) 50 mcg/0.5 mL susr injection 0.5mL by IntraMUSCular route once now and then repeat in 2-6 months 0.5 mL 1      Lab Results   Component Value Date/Time    Cholesterol, total 175 03/20/2019 12:21 PM    HDL Cholesterol 72 03/20/2019 12:21 PM    LDL, calculated 85 03/20/2019 12:21 PM    Triglyceride 91 03/20/2019 12:21 PM     Lab Results   Component Value Date/Time    ALT (SGPT) 23 03/20/2019 12:21 PM    AST (SGOT) 31 03/20/2019 12:21 PM    Alk. phosphatase 61 03/20/2019 12:21 PM    Bilirubin, direct 0.17 09/19/2018 01:33 PM    Bilirubin, total 0.9 03/20/2019 12:21 PM    Albumin 4.6 03/20/2019 12:21 PM    Protein, total 6.9 03/20/2019 12:21 PM    INR 1.0 08/14/2013 10:57 AM    Prothrombin time 10.3 08/14/2013 10:57 AM    PLATELET 172 60/78/3064 12:55 PM    Hepatitis B surface Ag 0.12 08/28/2013 03:00 PM     Lab Results   Component Value Date/Time    GFR est non-AA 65 03/20/2019 12:21 PM    GFR est AA 75 03/20/2019 12:21 PM    Creatinine 0.89 03/20/2019 12:21 PM    BUN 17 03/20/2019 12:21 PM    Sodium 145 (H) 03/20/2019 12:21 PM    Potassium 3.7 03/20/2019 12:21 PM    Chloride 102 03/20/2019 12:21 PM    CO2 26 03/20/2019 12:21 PM    Magnesium 1.8 09/10/2013 04:00 AM    Phosphorus 2.6 07/07/2011 03:20 PM        Objective:     Visit Vitals  /76   Pulse 72   Temp 98.5 °F (36.9 °C) (Oral)   Ht 5' 1\" (1.549 m)   Wt 157 lb (71.2 kg)   SpO2 97%   BMI 29.66 kg/m²      Appearance: alert, well appearing, and in no distress and oriented to person, place, and time.   NECK: supple, no lad, no bruit, no tm  LUNGS: cta bilat  CV rrr, no m/g/r  ABD: soft, nt, nd, nabs  EXT: no c/c/e    Assessment/Plan:   Hyperlipidemia well controlled. current treatment plan is effective, no change in therapy. Check labs    Borderline blood pressure - controlled today, cont same    Overweight- continue to work on diet and exercise for weight loss. Vit D def - repeat labs    OA bilat knees - continue to follow up with Dr. Bhargavi Rivas. Grief reaction - doing ok. Starting working has helped. Orders Placed This Encounter    METABOLIC PANEL, COMPREHENSIVE    LIPID PANEL    VITAMIN D, 25 HYDROXY    TSH 3RD GENERATION    ascorbic acid, vitamin C, (VITAMIN C) 500 mg tablet    cartilage/collagen II/hyaluron (MOVE FREE ULTRA PO)    pneumococcal 23-merna ps vaccine (PNEUMOVAX 23) 25 mcg/0.5 mL injection    varicella-zoster recombinant, PF, (SHINGRIX, PF,) 50 mcg/0.5 mL susr injection     Follow-up and Dispositions    · Return in about 6 months (around 3/18/2020) for hyperlipidemia. .  This is the Subsequent Medicare Annual Wellness Exam, performed 12 months or more after the Initial AWV or the last Subsequent AWV    I have reviewed the patient's medical history in detail and updated the computerized patient record.      History     Past Medical History:   Diagnosis Date    Anxiety and depression     Calculus     AT AGE 14; NO RECURRENCE    Cataracts, bilateral     Chronic pain     SINCE AGE 20-LOWER BACK INJ.-SKIING ACCIDENT    DJD (degenerative joint disease)     MULTIPLE SITES-KNEES,GREAT TOES,SPINE    H/O scarlet fever     Hypercholesterolemia     Multinodular goiter     Nausea & vomiting     /P HYSTERECTOMY 1980s    Other ill-defined conditions(683.78)     R LOWER LEG ANTERIORLY-NO SENSATION HOT/COL,SHARP/DULL    Pain 5/4/10    ADMITTED FOR INTRACTABLE PAIN--RIGHT SHIN NUMB EVER SINCE    Unspecified adverse effect of anesthesia     NORMAL DOSE VERSED INEFFECTIVE    UTI (lower urinary tract infection)     NONE IN A YEAR; H/O FREQUENT UTIs (4-5 A YEAR)-MACRODANTIN /P HAVING SEX      Past Surgical History:   Procedure Laterality Date    ENDOSCOPY, COLON, DIAGNOSTIC  2/2008    polyp ([place])-f/u 5 yrs.  HX BREAST BIOPSY Right     needle   benign    HX CERVICAL FUSION      HX GI  1993    ENDOSCOPY    HX HEENT  1998    BILAT UPPER & LOWER BLEPHOROPLASTY    HX HYSTERECTOMY  4/1980    WITH ANTERIOR REPAIR    HX ORTHOPAEDIC  X3  LATE 1980s    BASILAR THUMB JOINT REPLACEMENT    HX ORTHOPAEDIC  1995    ORIF L WRIST FRACTURE    HX ORTHOPAEDIC  1995    L WRIST REMOVAL PLATE & SCREWS    HX ORTHOPAEDIC  2006    EXC SRTHRITIC NODULES R GREAT & 2ND TOES    HX ORTHOPAEDIC  2006    BUNIONECTOMY RIGHT    HX ORTHOPAEDIC  07/12    C 4-6 FUSION    HX ORTHOPAEDIC  1978    CARPAL TUNNEL MANAN    HX ORTHOPAEDIC  10/12    LEFT BUNIONECTOMY, GREAT TOE, HAMMERTOE CORRECTION,    HX ORTHOPAEDIC      CORRECTION OF LEFT 3RD TOE AND NEUROMA REMOVAL    HX OTHER SURGICAL  3/2013    Correction of left 3rd toe.  HX TONSILLECTOMY  1952    HX UROLOGICAL  7/09    BLADDER SLING     Current Outpatient Medications   Medication Sig Dispense Refill    ascorbic acid, vitamin C, (VITAMIN C) 500 mg tablet Take  by mouth two (2) times a day.  cartilage/collagen II/hyaluron (MOVE FREE ULTRA PO) Take  by mouth. 1 tablet by mouth daily      estradiol (ESTRACE) 0.5 mg tablet Take 1 Tab by mouth daily. 30 Tab 11    atorvastatin (LIPITOR) 20 mg tablet Take 1 Tab by mouth daily. 90 Tab 3    meloxicam (MOBIC) 15 mg tablet TAKE 1 TABLET DAILY (REPLACES DICLOFENAC) 90 Tab 1    furosemide (LASIX) 40 mg tablet TAKE ONE-HALF TO ONE TABLET BY MOUTH ONCE DAILY AS NEEDED (SWELLING) 90 Tab 3    ALPRAZolam (XANAX) 0.5 mg tablet Take 0.5-1 Tabs by mouth two (2) times daily as needed for Anxiety. Max Daily Amount: 1 mg. 90 Tab 0    MULTIVITS-MIN/IRON/FA/LUTEIN (CENTRUM SILVER WOMEN PO) Take 1 Tab by mouth daily.       cholecalciferol (VITAMIN D3) 1,000 unit tablet Take 1,000 Units by mouth daily.  varicella-zoster recombinant, PF, (SHINGRIX, PF,) 50 mcg/0.5 mL susr injection 0.5mL by IntraMUSCular route once now and then repeat in 2-6 months 0.5 mL 1     Allergies   Allergen Reactions    Adhesive Rash     RED AND PUFFY SKIN    Atorvastatin Myalgia     Other reaction(s): Myalgia    Celebrex [Celecoxib] Other (comments)     INCREASED BP    Mucinex [Guaifenesin] Hives    Niaspan [Niacin] Other (comments)     SEVERE REDNESS, BURNING OF ENTIRE BODY WITH CHILLS     Family History   Problem Relation Age of Onset    Post-op Nausea/Vomiting Mother     Diabetes Mother     Coronary Artery Disease Mother     High Cholesterol Mother     Hypertension Mother     Heart Disease Mother         CARDIOMYOPATHY, CHF    Cancer Father         oat cell of lung    Alcohol abuse Father     Hypertension Sister     Hypertension Brother     High Cholesterol Brother     Other Sister         Sjogren's    Arthritis-osteo Sister     Cancer Maternal Uncle         COLON    Cancer Paternal Uncle     Cancer Paternal Uncle         COLON    Liver Disease Son    Ave Mika Syndrome Son     Cancer Paternal Aunt         BLADDER     Social History     Tobacco Use    Smoking status: Former Smoker     Packs/day: 3.00     Years: 20.00     Pack years: 60.00     Last attempt to quit: 1983     Years since quittin.2    Smokeless tobacco: Never Used    Tobacco comment: STARTED SMOKING AT AGE 11/ 1PPD BY AGE 14   Substance Use Topics    Alcohol use:  Yes     Alcohol/week: 2.0 - 3.0 standard drinks     Types: 1 Glasses of wine, 1 Shots of liquor per week     Comment: SOCIALLY     Patient Active Problem List   Diagnosis Code    Cervical spinal stenosis M48.02    Severe back pain M54.9    Abnormal urine odor R82.90    Hyperlipidemia E78.5    Depression F32.9    Recurrent UTI N39.0    Copy of advanced directive obtained Z78.9    Goiter, nodular E04.9    Vitamin D deficiency E55.9    Advance directive on file Z78.9       Depression Risk Factor Screening:     3 most recent PHQ Screens 6/30/2014   PHQ Not Done -   Little interest or pleasure in doing things Not at all   Feeling down, depressed, irritable, or hopeless Not at all   Total Score PHQ 2 0     Alcohol Risk Factor Screening:   Drinks 0-3 drinks per night - mostly socially. Usually 1- 2 times per week. Functional Ability and Level of Safety:   Hearing Loss  Hearing is good. Activities of Daily Living  The home contains: handrails and grab bars  Patient does total self care    Fall Risk  Fall Risk Assessment, last 12 mths 9/18/2019   Able to walk? Yes   Fall in past 12 months? Yes   Fall with injury? Yes   Number of falls in past 12 months 1   Fall Risk Score 2       Abuse Screen  Patient is not abused    Cognitive Screening   Evaluation of Cognitive Function:  Has your family/caregiver stated any concerns about your memory: no  Normal    Patient Care Team   Patient Care Team:  Hiro Lantigua MD as PCP - General (Internal Medicine)  Kathy Calderon MD (Orthopedic Surgery)  Jimenez Urena MD (Ophthalmology)    Assessment/Plan   Education and counseling provided:  Are appropriate based on today's review and evaluation   Advanced directed discussed with patient. Diagnoses and all orders for this visit:    1. Pure hypercholesterolemia  -     METABOLIC PANEL, COMPREHENSIVE  -     LIPID PANEL    2. Vitamin D deficiency  -     VITAMIN D, 25 HYDROXY    3. Primary osteoarthritis of both knees    4. Overweight (BMI 25.0-29.9)    5.  Grief reaction        Health Maintenance Due   Topic Date Due    COLONOSCOPY  Overdue - pt declines      Shingrix Vaccine Age 49> (1 of 2) Prescription given    Pneumococcal 65+ years (2 of 2 - PPSV23) Prescription given    Influenza Age 5 to Adult  08/01/2019    MEDICARE YEARLY EXAM  09/18/2020

## 2019-09-19 LAB
25(OH)D3+25(OH)D2 SERPL-MCNC: 56.2 NG/ML (ref 30–100)
ALBUMIN SERPL-MCNC: 4.3 G/DL (ref 3.5–4.8)
ALBUMIN/GLOB SERPL: 2.2 {RATIO} (ref 1.2–2.2)
ALP SERPL-CCNC: 64 IU/L (ref 39–117)
ALT SERPL-CCNC: 40 IU/L (ref 0–32)
AST SERPL-CCNC: 41 IU/L (ref 0–40)
BILIRUB SERPL-MCNC: 0.3 MG/DL (ref 0–1.2)
BUN SERPL-MCNC: 28 MG/DL (ref 8–27)
BUN/CREAT SERPL: 37 (ref 12–28)
CALCIUM SERPL-MCNC: 9.2 MG/DL (ref 8.7–10.3)
CHLORIDE SERPL-SCNC: 107 MMOL/L (ref 96–106)
CHOLEST SERPL-MCNC: 156 MG/DL (ref 100–199)
CO2 SERPL-SCNC: 22 MMOL/L (ref 20–29)
CREAT SERPL-MCNC: 0.75 MG/DL (ref 0.57–1)
GLOBULIN SER CALC-MCNC: 2 G/DL (ref 1.5–4.5)
GLUCOSE SERPL-MCNC: 91 MG/DL (ref 65–99)
HDLC SERPL-MCNC: 80 MG/DL
INTERPRETATION, 910389: NORMAL
LDLC SERPL CALC-MCNC: 60 MG/DL (ref 0–99)
POTASSIUM SERPL-SCNC: 4.1 MMOL/L (ref 3.5–5.2)
PROT SERPL-MCNC: 6.3 G/DL (ref 6–8.5)
SODIUM SERPL-SCNC: 146 MMOL/L (ref 134–144)
TRIGL SERPL-MCNC: 82 MG/DL (ref 0–149)
TSH SERPL DL<=0.005 MIU/L-ACNC: 0.99 UIU/ML (ref 0.45–4.5)
VLDLC SERPL CALC-MCNC: 16 MG/DL (ref 5–40)

## 2019-09-23 ENCOUNTER — TELEPHONE (OUTPATIENT)
Dept: INTERNAL MEDICINE CLINIC | Age: 74
End: 2019-09-23

## 2019-09-23 RX ORDER — ESTRADIOL 1 MG/1
1 TABLET ORAL DAILY
Qty: 90 TAB | Refills: 3 | Status: SHIPPED | OUTPATIENT
Start: 2019-09-23 | End: 2020-09-17 | Stop reason: SDUPTHER

## 2019-09-23 NOTE — TELEPHONE ENCOUNTER
----- Message from Lay Villa LPN sent at 7/63/7595  7:56 AM EDT -----  Regarding: FW: Prescription Question  Contact: [phone]      ----- Message -----  From: Jasmyne Ramirez  Sent: 9/22/2019   3:38 PM EDT  To: Hank Dempsey  Subject: Prescription Question                            Dr. Yuniel Solis,    Would you please send a script to [address] [place] [address] mail order pharmacy for the estradiol 1 mg. Tabs? It is more expensive at the [facility] and that Rx is for the 0.5 mg. You told me I could take the 1 mg. and then break them  for the 0.5 mg when I cut back. A 90 day supply is less expensive.     Thanks,    Obey Armendariz

## 2019-12-16 RX ORDER — MELOXICAM 15 MG/1
TABLET ORAL
Qty: 90 TAB | Refills: 1 | Status: SHIPPED | OUTPATIENT
Start: 2019-12-16 | End: 2020-06-16

## 2019-12-18 ENCOUNTER — OFFICE VISIT (OUTPATIENT)
Dept: INTERNAL MEDICINE CLINIC | Age: 74
End: 2019-12-18

## 2019-12-18 VITALS
OXYGEN SATURATION: 98 % | BODY MASS INDEX: 28.7 KG/M2 | WEIGHT: 152 LBS | SYSTOLIC BLOOD PRESSURE: 138 MMHG | HEART RATE: 89 BPM | TEMPERATURE: 98.4 F | RESPIRATION RATE: 16 BRPM | HEIGHT: 61 IN | DIASTOLIC BLOOD PRESSURE: 82 MMHG

## 2019-12-18 DIAGNOSIS — Z23 ENCOUNTER FOR IMMUNIZATION: ICD-10-CM

## 2019-12-18 DIAGNOSIS — H26.9 CATARACT OF BOTH EYES, UNSPECIFIED CATARACT TYPE: Primary | ICD-10-CM

## 2019-12-18 RX ORDER — OFLOXACIN 3 MG/ML
SOLUTION/ DROPS OPHTHALMIC
COMMUNITY
Start: 2019-12-03 | End: 2021-06-21 | Stop reason: ALTCHOICE

## 2019-12-18 RX ORDER — PREDNISOLONE ACETATE 10 MG/ML
SUSPENSION/ DROPS OPHTHALMIC
COMMUNITY
Start: 2019-12-03 | End: 2021-06-21 | Stop reason: ALTCHOICE

## 2019-12-18 NOTE — PROGRESS NOTES
1. Have you been to the ER, urgent care clinic since your last visit? Hospitalized since your last visit?no    2. Have you seen or consulted any other health care providers outside of the [address] [place] [address] since your last visit? Include any pap smears or colon screening.  Yes  Chief Complaint   Patient presents with    Pre-op Exam     left eye on the 12/30/19 and right eye on 01/16/20 at [place]      Fasting

## 2019-12-18 NOTE — PATIENT INSTRUCTIONS
Vaccine Information Statement    Pneumococcal Polysaccharide Vaccine (PPSV23): What You Need to Know    Many Vaccine Information Statements are available in Citizen of [place] and other languages. See www.immunize.org/vis  Hojas de información sobre vacunas están disponibles en español y en muchos otros idiomas. Visite www.immunize.org/vis    1. Why get vaccinated? Pneumococcal polysaccharide vaccine (PPSV23) can prevent pneumococcal disease. Pneumococcal disease refers to any illness caused by pneumococcal bacteria. These bacteria can cause many types of illnesses, including pneumonia, which is an infection of the lungs. Pneumococcal bacteria are one of the most common causes of pneumonia. Besides pneumonia, pneumococcal bacteria can also cause:   Ear infections   Sinus infections   Meningitis (infection of the tissue covering the brain and spinal cord)   Bacteremia (bloodstream infection)    Anyone can get pneumococcal disease, but children under 3years of age, people with certain medical conditions, adults 72 years or older, and cigarette smokers are at the highest risk. Most pneumococcal infections are mild. However, some can result in long-term problems, such as brain damage or hearing loss. Meningitis, bacteremia, and pneumonia caused by pneumococcal disease can be fatal.     2. PPSV23     PPSV23 protects against 23 types of bacteria that cause pneumococcal disease. PPSV23 is recommended for:   All adults 72 years or older,   Anyone 2 years or older with certain medical conditions that can lead to an increased risk for pneumococcal disease. Most people need only one dose of PPSV23. A second dose of PPSV23, and another type of pneumococcal vaccine called PCV13, are recommended for certain high-risk groups. Your health care provider can give you more information.     People 65 years or older should get a dose of PPSV23 even if they have already gotten one or more doses of the vaccine before they turned 72.    3. Talk with your health care provider    Tell your vaccine provider if the person getting the vaccine:   Has had an allergic reaction after a previous dose of PPSV23, or has any severe, life-threatening allergies. In some cases, your health care provider may decide to postpone PPSV23 vaccination to a future visit. People with minor illnesses, such as a cold, may be vaccinated. People who are moderately or severely ill should usually wait until they recover before getting PPSV23. Your health care provider can give you more information. 4. Risks of a vaccine reaction     Redness or pain where the shot is given, feeling tired, fever, or muscle aches can happen after PPSV23. People sometimes faint after medical procedures, including vaccination. Tell your provider if you feel dizzy or have vision changes or ringing in the ears. As with any medicine, there is a very remote chance of a vaccine causing a severe allergic reaction, other serious injury, or death. 5. What if there is a serious problem? An allergic reaction could occur after the vaccinated person leaves the clinic. If you see signs of a severe allergic reaction (hives, swelling of the face and throat, difficulty breathing, a fast heartbeat, dizziness, or weakness), call 9-1-1 and get the person to the nearest hospital.    For other signs that concern you, call your health care provider. Adverse reactions should be reported to the Vaccine Adverse Event Reporting System (VAERS). Your health care provider will usually file this report, or you can do it yourself. Visit the VAERS website at www.vaers. hhs.gov or call 2-807.719.6714. VAERS is only for reporting reactions, and VAERS staff do not give medical advice. 6. How can I learn more?  Ask your health care provider.  Call your local or state health department.    Contact the Centers for Disease Control and Prevention (CDC):  - Call 8-427.196.3480 (6-564-MSA-INFO) or  - Visit CDCs website at www.cdc.gov/vaccines    Vaccine Information Statement   PPSV23   10/30/2019    [facility] for Disease Control and Prevention    Office Use Only      Vaccine Information Statement    Influenza (Flu) Vaccine (Inactivated or Recombinant): What You Need to Know    Many Vaccine Information Statements are available in Citizen of [place] and other languages. See www.immunize.org/vis  Hojas de información sobre vacunas están disponibles en español y en muchos otros idiomas. Visite www.immunize.org/vis    1. Why get vaccinated? Influenza vaccine can prevent influenza (flu). Flu is a contagious disease that spreads around the [place] every year, usually between October and May. Anyone can get the flu, but it is more dangerous for some people. Infants and young children, people 72years of age and older, pregnant women, and people with certain health conditions or a weakened immune system are at greatest risk of flu complications. Pneumonia, bronchitis, sinus infections and ear infections are examples of flu-related complications. If you have a medical condition, such as heart disease, cancer or diabetes, flu can make it worse. Flu can cause fever and chills, sore throat, muscle aches, fatigue, cough, headache, and runny or stuffy nose. Some people may have vomiting and diarrhea, though this is more common in children than adults. Each year thousands of people in the [facility] die from flu, and many more are hospitalized. Flu vaccine prevents millions of illnesses and flu-related visits to the doctor each year. 2. Influenza vaccines     CDC recommends everyone 10months of age and older get vaccinated every flu season. Children 6 months through 6years of age may need 2 doses during a single flu season. Everyone else needs only 1 dose each flu season.     It takes about 2 weeks for protection to develop after vaccination. There are many flu viruses, and they are always changing. Each year a new flu vaccine is made to protect against three or four viruses that are likely to cause disease in the upcoming flu season. Even when the vaccine doesnt exactly match these viruses, it may still provide some protection. Influenza vaccine does not cause flu. Influenza vaccine may be given at the same time as other vaccines. 3. Talk with your health care provider    Tell your vaccine provider if the person getting the vaccine:   Has had an allergic reaction after a previous dose of influenza vaccine, or has any severe, life-threatening allergies.  Has ever had Guillain-Barré Syndrome (also called GBS). In some cases, your health care provider may decide to postpone influenza vaccination to a future visit. People with minor illnesses, such as a cold, may be vaccinated. People who are moderately or severely ill should usually wait until they recover before getting influenza vaccine. Your health care provider can give you more information. 4. Risks of a reaction     Soreness, redness, and swelling where shot is given, fever, muscle aches, and headache can happen after influenza vaccine.  There may be a very small increased risk of Guillain-Barré Syndrome (GBS) after inactivated influenza vaccine (the flu shot). [place] children who get the flu shot along with pneumococcal vaccine (PCV13), and/or DTaP vaccine at the same time might be slightly more likely to have a seizure caused by fever. Tell your health care provider if a child who is getting flu vaccine has ever had a seizure. People sometimes faint after medical procedures, including vaccination. Tell your provider if you feel dizzy or have vision changes or ringing in the ears. As with any medicine, there is a very remote chance of a vaccine causing a severe allergic reaction, other serious injury, or death.     5. What if there is a serious problem? An allergic reaction could occur after the vaccinated person leaves the clinic. If you see signs of a severe allergic reaction (hives, swelling of the face and throat, difficulty breathing, a fast heartbeat, dizziness, or weakness), call 9-1-1 and get the person to the nearest hospital.    For other signs that concern you, call your health care provider. Adverse reactions should be reported to the Vaccine Adverse Event Reporting System (VAERS). Your health care provider will usually file this report, or you can do it yourself. Visit the VAERS website at www.vaers. hhs.gov or call 2-714.306.5367. VAERS is only for reporting reactions, and VAERS staff do not give medical advice. 6. The National Vaccine Injury Compensation Program    The [facility] Vaccine Injury Compensation Program (VICP) is a federal program that was created to compensate people who may have been injured by certain vaccines. Visit the VICP website at www.[facility].gov/vaccinecompensation or call 6-847.706.3217 to learn about the program and about filing a claim. There is a time limit to file a claim for compensation. 7. How can I learn more?  Ask your health care provider.  Call your local or state health department.  Contact the Centers for Disease Control and Prevention (CDC):  - Call 2-983.851.3973 (1-800-CDC-INFO) or  - Visit CDCs influenza website at www.cdc.gov/flu    Vaccine Information Statement (Interim)  Inactivated Influenza Vaccine   8/15/2019  [address] 344SV-91   Department of Health and Human Services  Centers for Disease Control and Prevention    Office Use Only
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
## 2020-02-26 ENCOUNTER — TELEPHONE (OUTPATIENT)
Dept: INTERNAL MEDICINE CLINIC | Age: 75
End: 2020-02-26

## 2020-02-26 RX ORDER — AZITHROMYCIN 250 MG/1
250 TABLET, FILM COATED ORAL SEE ADMIN INSTRUCTIONS
Qty: 6 TAB | Refills: 0 | Status: SHIPPED | OUTPATIENT
Start: 2020-02-26 | End: 2020-02-26 | Stop reason: SDUPTHER

## 2020-02-26 RX ORDER — AZITHROMYCIN 250 MG/1
250 TABLET, FILM COATED ORAL SEE ADMIN INSTRUCTIONS
Qty: 6 TAB | Refills: 0 | Status: SHIPPED | OUTPATIENT
Start: 2020-02-26 | End: 2020-03-02

## 2020-02-26 NOTE — TELEPHONE ENCOUNTER
----- Message from Davey Andersonin sent at 2/26/2020  7:56 AM EST -----  Regarding: FW: Prescription Question  Contact: [phone]    ----- Message -----  From: [address] [place] [address] Road: 2/25/2020   6:39 PM EST  To: [facility] Nurse Pool  Subject: Prescription Question                            Dr. Markus Howe,  I have a bad sinus infection with thick green secretions with some dark brown blood. It has been going on for about a week. I was wondering if you would let me have a z-brittney to try to get rid of it. I have an appt to see you on March 16, but don't think I can wait that long. I am using Claritin and Vit. C. I can't take Mucinex. It gave me hives last year. No fever. Just the sinus congestion.   Kathy Rocha

## 2020-03-15 NOTE — PROGRESS NOTES
Subjective: Anahi Jay is a 69 y.o. female who is seen for follow up of hyperlipidemia. Cardiovascular risk analysis - 69 y.o. female LDL goal is under 130. Compliance with treatment thus far has been good. ROS: taking medications as instructed, no medication side effects noted, no TIA's, no chest pain on exertion, no dyspnea on exertion, noting swelling of ankles - mild and unchanged, no orthostatic dizziness or lightheadedness, no palpitations. New concerns:   Grief reaction - slowly improving. [place] was hard. Elevated blood pressure - not checking at home. HRT - back on estradiol 1mg every day. Much better with resolution of hot flashes. Having eye surgery IOL exchange with [facility] by Dr. Roma Lan on 4/16/2020. preop form completed today. Seeing Dr. Dieudonne Gooden for bilat OA of knees. Has recommended TKR. Current Outpatient Medications   Medication Sig Dispense Refill    glucosamine-chondroitin (ARTHX) 500-400 mg cap Take 1 Cap by mouth daily.  meloxicam (MOBIC) 15 mg tablet TAKE 1 TABLET DAILY        (REPLACES DICLOFENAC) 90 Tab 1    estradiol (ESTRACE) 1 mg tablet Take 1 Tab by mouth daily. 90 Tab 3    ascorbic acid, vitamin C, (VITAMIN C) 500 mg tablet Take  by mouth daily.  atorvastatin (LIPITOR) 20 mg tablet Take 1 Tab by mouth daily. 90 Tab 3    furosemide (LASIX) 40 mg tablet TAKE ONE-HALF TO ONE TABLET BY MOUTH ONCE DAILY AS NEEDED (SWELLING) 90 Tab 3    ALPRAZolam (XANAX) 0.5 mg tablet Take 0.5-1 Tabs by mouth two (2) times daily as needed for Anxiety. Max Daily Amount: 1 mg. 90 Tab 0    MULTIVITS-MIN/IRON/FA/LUTEIN (CENTRUM SILVER WOMEN PO) Take 1 Tab by mouth daily.  cholecalciferol (VITAMIN D3) 1,000 unit tablet Take 1,000 Units by mouth daily.       ketorolac (ACULAR) 0.5 % ophthalmic solution       ofloxacin (FLOXIN) 0.3 % ophthalmic solution       prednisoLONE acetate (PRED FORTE) 1 % ophthalmic suspension         Lab Results   Component Value Date/Time    Cholesterol, total 156 09/18/2019 11:56 AM    HDL Cholesterol 80 09/18/2019 11:56 AM    LDL, calculated 60 09/18/2019 11:56 AM    Triglyceride 82 09/18/2019 11:56 AM     Lab Results   Component Value Date/Time    ALT (SGPT) 40 (H) 09/18/2019 11:56 AM    AST (SGOT) 41 (H) 09/18/2019 11:56 AM    Alk. phosphatase 64 09/18/2019 11:56 AM    Bilirubin, direct 0.17 09/19/2018 01:33 PM    Bilirubin, total 0.3 09/18/2019 11:56 AM    Albumin 4.3 09/18/2019 11:56 AM    Protein, total 6.3 09/18/2019 11:56 AM    INR 1.0 08/14/2013 10:57 AM    Prothrombin time 10.3 08/14/2013 10:57 AM    PLATELET 551 31/22/5292 12:55 PM    Hepatitis B surface Ag 0.12 08/28/2013 03:00 PM        Objective:     Visit Vitals  /80   Pulse 79   Temp 97.9 °F (36.6 °C) (Oral)   Ht 5' 1\" (1.549 m)   Wt 157 lb (71.2 kg)   SpO2 98%   BMI 29.66 kg/m²      Appearance: alert, well appearing, and in no distress and oriented to person, place, and time. CVS exam   NECK: supple, no lad, no bruit, no tm  LUNGS: cta bilat  CV rrr, no m/g/r  ABD: soft, nt, nd, nabs  EXT: no c/c/e    Assessment/Plan:   Hyperlipidemia well controlled. current treatment plan is effective, no change in therapy. Check labs    Grief reaction with underlying major depression - improved, cont same    OA knees - discussed TKR. Will consider. Symptomatic menopause - improved with restarting HRT    HM - needs colonscopy 0- would like to schedule closer to home. preop completed for IOL exchange left eye - see scanned sheet. Orders Placed This Encounter    LIPID PANEL    HEPATIC FUNCTION PANEL    ketorolac (ACULAR) 0.5 % ophthalmic solution    glucosamine-chondroitin (ARTHX) 500-400 mg cap       .

## 2020-03-16 ENCOUNTER — OFFICE VISIT (OUTPATIENT)
Dept: INTERNAL MEDICINE CLINIC | Age: 75
End: 2020-03-16

## 2020-03-16 ENCOUNTER — HOSPITAL ENCOUNTER (OUTPATIENT)
Dept: LAB | Age: 75
Discharge: HOME | End: 2020-03-16

## 2020-03-16 ENCOUNTER — DOCUMENTATION ONLY (OUTPATIENT)
Dept: INTERNAL MEDICINE CLINIC | Age: 75
End: 2020-03-16

## 2020-03-16 VITALS
TEMPERATURE: 97.9 F | DIASTOLIC BLOOD PRESSURE: 80 MMHG | OXYGEN SATURATION: 98 % | BODY MASS INDEX: 29.64 KG/M2 | WEIGHT: 157 LBS | HEART RATE: 79 BPM | HEIGHT: 61 IN | SYSTOLIC BLOOD PRESSURE: 138 MMHG

## 2020-03-16 DIAGNOSIS — F33.1 MODERATE EPISODE OF RECURRENT MAJOR DEPRESSIVE DISORDER (HCC): ICD-10-CM

## 2020-03-16 DIAGNOSIS — E78.00 PURE HYPERCHOLESTEROLEMIA: Primary | ICD-10-CM

## 2020-03-16 DIAGNOSIS — E78.00 PURE HYPERCHOLESTEROLEMIA: ICD-10-CM

## 2020-03-16 DIAGNOSIS — M17.0 PRIMARY OSTEOARTHRITIS OF BOTH KNEES: ICD-10-CM

## 2020-03-16 DIAGNOSIS — F43.21 GRIEF REACTION: ICD-10-CM

## 2020-03-16 DIAGNOSIS — Z01.818 PREOP EXAMINATION: ICD-10-CM

## 2020-03-16 DIAGNOSIS — E66.3 OVERWEIGHT (BMI 25.0-29.9): ICD-10-CM

## 2020-03-16 PROBLEM — M84.40XA SPONTANEOUS FRACTURE: Status: ACTIVE | Noted: 2017-07-24

## 2020-03-16 PROBLEM — M17.12 PRIMARY OSTEOARTHRITIS OF LEFT KNEE: Status: ACTIVE | Noted: 2017-07-24

## 2020-03-16 LAB
ALBUMIN SERPL-MCNC: 3.9 G/DL (ref 3.5–5)
ALBUMIN/GLOB SERPL: 1.3 {RATIO} (ref 1.1–2.2)
ALP SERPL-CCNC: 79 U/L (ref 45–117)
ALT SERPL-CCNC: 49 U/L (ref 12–78)
AST SERPL-CCNC: 34 U/L (ref 15–37)
BILIRUB DIRECT SERPL-MCNC: 0.2 MG/DL (ref 0–0.2)
BILIRUB SERPL-MCNC: 0.7 MG/DL (ref 0.2–1)
CHOLEST SERPL-MCNC: 197 MG/DL
GLOBULIN SER CALC-MCNC: 3 G/DL (ref 2–4)
HDLC SERPL-MCNC: 67 MG/DL
HDLC SERPL: 2.9 {RATIO} (ref 0–5)
LDLC SERPL CALC-MCNC: 99.8 MG/DL (ref 0–100)
LIPID PROFILE,FLP: ABNORMAL
PROT SERPL-MCNC: 6.9 G/DL (ref 6.4–8.2)
TRIGL SERPL-MCNC: 151 MG/DL (ref ?–150)
VLDLC SERPL CALC-MCNC: 30.2 MG/DL

## 2020-03-16 RX ORDER — KETOROLAC TROMETHAMINE 5 MG/ML
SOLUTION OPHTHALMIC
COMMUNITY
Start: 2020-01-14 | End: 2021-06-21 | Stop reason: ALTCHOICE

## 2020-03-16 RX ORDER — LANOLIN ALCOHOL/MO/W.PET/CERES
1 CREAM (GRAM) TOPICAL DAILY
COMMUNITY
End: 2021-06-21 | Stop reason: ALTCHOICE

## 2020-03-17 RX ORDER — ATORVASTATIN CALCIUM 20 MG/1
TABLET, FILM COATED ORAL
Qty: 90 TAB | Refills: 3 | Status: SHIPPED | OUTPATIENT
Start: 2020-03-17 | End: 2020-09-17 | Stop reason: SDUPTHER

## 2020-03-23 ENCOUNTER — PATIENT MESSAGE (OUTPATIENT)
Dept: INTERNAL MEDICINE CLINIC | Age: 75
End: 2020-03-23

## 2020-03-24 ENCOUNTER — TELEPHONE (OUTPATIENT)
Dept: INTERNAL MEDICINE CLINIC | Age: 75
End: 2020-03-24

## 2020-03-24 RX ORDER — SULFAMETHOXAZOLE AND TRIMETHOPRIM 800; 160 MG/1; MG/1
1 TABLET ORAL 2 TIMES DAILY
Qty: 30 TAB | Refills: 0 | Status: SHIPPED | OUTPATIENT
Start: 2020-03-24 | End: 2021-06-21 | Stop reason: ALTCHOICE

## 2020-06-16 RX ORDER — MELOXICAM 15 MG/1
TABLET ORAL
Qty: 90 TAB | Refills: 1 | Status: SHIPPED | OUTPATIENT
Start: 2020-06-16 | End: 2020-09-17 | Stop reason: SDUPTHER

## 2020-09-16 DIAGNOSIS — E55.9 VITAMIN D DEFICIENCY: ICD-10-CM

## 2020-09-16 DIAGNOSIS — E78.00 PURE HYPERCHOLESTEROLEMIA: Primary | ICD-10-CM

## 2020-09-16 DIAGNOSIS — E78.00 PURE HYPERCHOLESTEROLEMIA: ICD-10-CM

## 2020-09-17 RX ORDER — MELOXICAM 15 MG/1
15 TABLET ORAL DAILY
Qty: 90 TAB | Refills: 1 | Status: SHIPPED | OUTPATIENT
Start: 2020-09-17 | End: 2021-07-23 | Stop reason: SDUPTHER

## 2020-09-17 RX ORDER — ATORVASTATIN CALCIUM 20 MG/1
20 TABLET, FILM COATED ORAL DAILY
Qty: 90 TAB | Refills: 3 | Status: SHIPPED | OUTPATIENT
Start: 2020-09-17 | End: 2022-01-10 | Stop reason: SDUPTHER

## 2020-09-17 RX ORDER — ESTRADIOL 1 MG/1
1 TABLET ORAL DAILY
Qty: 90 TAB | Refills: 3 | Status: SHIPPED | OUTPATIENT
Start: 2020-09-17 | End: 2021-09-16

## 2020-09-17 RX ORDER — FUROSEMIDE 40 MG/1
TABLET ORAL
Qty: 90 TAB | Refills: 3 | Status: SHIPPED | OUTPATIENT
Start: 2020-09-17

## 2020-09-25 ENCOUNTER — HOSPITAL ENCOUNTER (OUTPATIENT)
Dept: LAB | Age: 75
Discharge: HOME | End: 2020-09-25
Payer: MEDICARE

## 2020-09-25 PROCEDURE — 82306 VITAMIN D 25 HYDROXY: CPT

## 2020-09-25 PROCEDURE — 36415 COLL VENOUS BLD VENIPUNCTURE: CPT

## 2020-09-25 PROCEDURE — 80061 LIPID PANEL: CPT

## 2020-09-25 PROCEDURE — 80053 COMPREHEN METABOLIC PANEL: CPT

## 2020-09-26 LAB
25(OH)D3+25(OH)D2 SERPL-MCNC: 62.9 NG/ML (ref 30–100)
ALBUMIN SERPL-MCNC: 4.7 G/DL (ref 3.7–4.7)
ALBUMIN/GLOB SERPL: 2 {RATIO} (ref 1.2–2.2)
ALP SERPL-CCNC: 71 IU/L (ref 39–117)
ALT SERPL-CCNC: 27 IU/L (ref 0–32)
AST SERPL-CCNC: 29 IU/L (ref 0–40)
BILIRUB SERPL-MCNC: 0.8 MG/DL (ref 0–1.2)
BUN SERPL-MCNC: 11 MG/DL (ref 8–27)
BUN/CREAT SERPL: 14 (ref 12–28)
CALCIUM SERPL-MCNC: 9.7 MG/DL (ref 8.7–10.3)
CHLORIDE SERPL-SCNC: 101 MMOL/L (ref 96–106)
CHOLEST SERPL-MCNC: 193 MG/DL (ref 100–199)
CO2 SERPL-SCNC: 27 MMOL/L (ref 20–29)
CREAT SERPL-MCNC: 0.81 MG/DL (ref 0.57–1)
GLOBULIN SER CALC-MCNC: 2.3 G/DL (ref 1.5–4.5)
GLUCOSE SERPL-MCNC: 92 MG/DL (ref 65–99)
HDLC SERPL-MCNC: 94 MG/DL
INTERPRETATION, 910389: NORMAL
LDLC SERPL CALC-MCNC: 79 MG/DL (ref 0–99)
POTASSIUM SERPL-SCNC: 4.1 MMOL/L (ref 3.5–5.2)
PROT SERPL-MCNC: 7 G/DL (ref 6–8.5)
SODIUM SERPL-SCNC: 141 MMOL/L (ref 134–144)
TRIGL SERPL-MCNC: 116 MG/DL (ref 0–149)
VLDLC SERPL CALC-MCNC: 20 MG/DL (ref 5–40)

## 2021-04-29 ENCOUNTER — TELEPHONE (OUTPATIENT)
Dept: INTERNAL MEDICINE CLINIC | Age: 76
End: 2021-04-29
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
## 2021-04-29 NOTE — TELEPHONE ENCOUNTER
Discussed. Mild congesiton, more achy at night. Decreased appetite. NO sob, O2 sat 98%. No tightness in chest.  Otherwise feeling well. No fevers . Daughter with more symptoms. Son in law with less symptoms. Discussed that Invermectin only currently being used in clinical trials.

## 2021-05-06 ENCOUNTER — TELEPHONE (OUTPATIENT)
Dept: INTERNAL MEDICINE CLINIC | Age: 76
End: 2021-05-06

## 2021-05-13 ENCOUNTER — TELEPHONE (OUTPATIENT)
Dept: INTERNAL MEDICINE CLINIC | Age: 76
End: 2021-05-13

## 2021-05-13 RX ORDER — PROMETHAZINE HYDROCHLORIDE 25 MG/1
25 SUPPOSITORY RECTAL
Qty: 24 SUPPOSITORY | Refills: 0 | Status: SHIPPED | OUTPATIENT
Start: 2021-05-13 | End: 2021-05-20
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
## 2021-05-20 ENCOUNTER — TELEPHONE (OUTPATIENT)
Dept: INTERNAL MEDICINE CLINIC | Age: 76
End: 2021-05-20

## 2021-05-20 DIAGNOSIS — E78.00 PURE HYPERCHOLESTEROLEMIA: Primary | ICD-10-CM

## 2021-06-17 LAB
ALBUMIN SERPL-MCNC: 4.2 G/DL (ref 3.7–4.7)
ALP SERPL-CCNC: 62 IU/L (ref 48–121)
ALT SERPL-CCNC: 15 IU/L (ref 0–32)
AST SERPL-CCNC: 28 IU/L (ref 0–40)
BILIRUB DIRECT SERPL-MCNC: 0.19 MG/DL (ref 0–0.4)
BILIRUB SERPL-MCNC: 0.7 MG/DL (ref 0–1.2)
CHOLEST SERPL-MCNC: 201 MG/DL (ref 100–199)
HDLC SERPL-MCNC: 69 MG/DL
IMP & REVIEW OF LAB RESULTS: NORMAL
LDLC SERPL CALC-MCNC: 111 MG/DL (ref 0–99)
PROT SERPL-MCNC: 6.6 G/DL (ref 6–8.5)
TRIGL SERPL-MCNC: 123 MG/DL (ref 0–149)
VLDLC SERPL CALC-MCNC: 21 MG/DL (ref 5–40)

## 2021-06-21 ENCOUNTER — OFFICE VISIT (OUTPATIENT)
Dept: INTERNAL MEDICINE CLINIC | Age: 76
End: 2021-06-21
Payer: MEDICARE

## 2021-06-21 VITALS
OXYGEN SATURATION: 98 % | DIASTOLIC BLOOD PRESSURE: 70 MMHG | BODY MASS INDEX: 31.91 KG/M2 | HEART RATE: 73 BPM | WEIGHT: 169 LBS | RESPIRATION RATE: 20 BRPM | HEIGHT: 61 IN | SYSTOLIC BLOOD PRESSURE: 134 MMHG | TEMPERATURE: 98.2 F

## 2021-06-21 DIAGNOSIS — R60.0 BILATERAL LEG EDEMA: ICD-10-CM

## 2021-06-21 DIAGNOSIS — Z71.89 ADVANCE DIRECTIVE DISCUSSED WITH PATIENT: ICD-10-CM

## 2021-06-21 DIAGNOSIS — E78.00 PURE HYPERCHOLESTEROLEMIA: Primary | ICD-10-CM

## 2021-06-21 DIAGNOSIS — Z00.00 MEDICARE ANNUAL WELLNESS VISIT, SUBSEQUENT: ICD-10-CM

## 2021-06-21 DIAGNOSIS — Z12.11 SCREEN FOR COLON CANCER: ICD-10-CM

## 2021-06-21 DIAGNOSIS — F33.1 MODERATE EPISODE OF RECURRENT MAJOR DEPRESSIVE DISORDER (HCC): ICD-10-CM

## 2021-06-21 DIAGNOSIS — Z12.31 ENCOUNTER FOR SCREENING MAMMOGRAM FOR MALIGNANT NEOPLASM OF BREAST: ICD-10-CM

## 2021-06-21 DIAGNOSIS — E87.6 HYPOKALEMIA: ICD-10-CM

## 2021-06-21 PROCEDURE — 1090F PRES/ABSN URINE INCON ASSESS: CPT | Performed by: INTERNAL MEDICINE

## 2021-06-21 PROCEDURE — G8427 DOCREV CUR MEDS BY ELIG CLIN: HCPCS | Performed by: INTERNAL MEDICINE

## 2021-06-21 PROCEDURE — G9899 SCRN MAM PERF RSLTS DOC: HCPCS | Performed by: INTERNAL MEDICINE

## 2021-06-21 PROCEDURE — G8536 NO DOC ELDER MAL SCRN: HCPCS | Performed by: INTERNAL MEDICINE

## 2021-06-21 PROCEDURE — G0463 HOSPITAL OUTPT CLINIC VISIT: HCPCS | Performed by: INTERNAL MEDICINE

## 2021-06-21 PROCEDURE — 3017F COLORECTAL CA SCREEN DOC REV: CPT | Performed by: INTERNAL MEDICINE

## 2021-06-21 PROCEDURE — G0439 PPPS, SUBSEQ VISIT: HCPCS | Performed by: INTERNAL MEDICINE

## 2021-06-21 PROCEDURE — 99214 OFFICE O/P EST MOD 30 MIN: CPT | Performed by: INTERNAL MEDICINE

## 2021-06-21 PROCEDURE — G8417 CALC BMI ABV UP PARAM F/U: HCPCS | Performed by: INTERNAL MEDICINE

## 2021-06-21 PROCEDURE — G9717 DOC PT DX DEP/BP F/U NT REQ: HCPCS | Performed by: INTERNAL MEDICINE

## 2021-06-21 PROCEDURE — 1101F PT FALLS ASSESS-DOCD LE1/YR: CPT | Performed by: INTERNAL MEDICINE

## 2021-06-21 PROCEDURE — G8399 PT W/DXA RESULTS DOCUMENT: HCPCS | Performed by: INTERNAL MEDICINE

## 2021-06-21 RX ORDER — ZOSTER VACCINE RECOMBINANT, ADJUVANTED 50 MCG/0.5
KIT INTRAMUSCULAR
Qty: 0.5 ML | Refills: 1 | Status: SHIPPED | OUTPATIENT
Start: 2021-06-21

## 2021-06-21 RX ORDER — ZINC GLUCONATE 10 MG
250 LOZENGE ORAL DAILY
COMMUNITY
End: 2022-01-10 | Stop reason: SDUPTHER

## 2021-06-21 RX ORDER — CHOLECALCIFEROL (VITAMIN D3) 125 MCG
10000 CAPSULE ORAL
COMMUNITY
Start: 2021-05-06

## 2021-06-21 NOTE — PROGRESS NOTES
Subjective: Lindsay Cordon is a 70 y.o. female who is seen for follow up of hyperlipidemia. Cardiovascular risk analysis - 70 y.o. female LDL goal is under 130. Compliance with treatment thus far has been good. ROS: taking medications as instructed, no medication side effects noted, no TIA's, no chest pain on exertion, no dyspnea on exertion, noting swelling of ankles, no orthostatic dizziness or lightheadedness, no palpitations. More ankle swelling post hospitalization. Has swollen more with work. Sleeping with feet elevated. Took Lasix last Monday or Tuesday. New concerns:   Grief reaction with underlying major depression. Improved. Still misses  but overall better. Job has helped. Working 3 days a week at [employer]. Getting better, regaining strength since COVID. Hospitalized in April for severe nausea as main symptoms but COVID pneumonia on CXR. Zofran did nothing but Phenergan helped. Also received Remdisivir. Nausea was her main symptom. Sent home with Home O2 but only needed for 1-2 days. Needs R TKR. Seeing Dr. Samantha Delarosa. Current Outpatient Medications   Medication Sig Dispense Refill    cholecalciferol (VITAMIN D3) (5000 Units /125 mcg) capsule Take 10,000 Units by mouth.  magnesium 250 mg tab Take 250 mg by mouth daily.  zinc acetate 50 mg (zinc) cap Take 50 mg by mouth daily.  varicella-zoster recombinant, PF, (Shingrix, PF,) 50 mcg/0.5 mL susr injection 0.5mL by IntraMUSCular route once now and then repeat in 2-6 months 0.5 mL 1    meloxicam (MOBIC) 15 mg tablet Take 1 Tab by mouth daily. 90 Tab 1    furosemide (LASIX) 40 mg tablet TAKE ONE-HALF TO ONE TABLET BY MOUTH ONCE DAILY AS NEEDED (SWELLING) (Patient taking differently: Take 40 mg by mouth daily as needed. Swelling.) 90 Tab 3    atorvastatin (LIPITOR) 20 mg tablet Take 1 Tab by mouth daily. 90 Tab 3    estradioL (ESTRACE) 1 mg tablet Take 1 Tab by mouth daily.  90 Tab 3    ALPRAZolam Cheryl Bliss) 0.5 mg tablet Take 0.5-1 Tabs by mouth two (2) times daily as needed for Anxiety. Max Daily Amount: 1 mg. 90 Tab 0    MULTIVITS-MIN/IRON/FA/LUTEIN (CENTRUM SILVER WOMEN PO) Take 1 Tab by mouth daily. Lab Results   Component Value Date/Time    Cholesterol, total 201 (H) 06/16/2021 04:16 PM    HDL Cholesterol 69 06/16/2021 04:16 PM    LDL, calculated 111 (H) 06/16/2021 04:16 PM    LDL, calculated 99.8 03/16/2020 11:36 AM    Triglyceride 123 06/16/2021 04:16 PM    CHOL/HDL Ratio 2.9 03/16/2020 11:36 AM     Lab Results   Component Value Date/Time    ALT (SGPT) 15 06/16/2021 04:16 PM    Alk. phosphatase 62 06/16/2021 04:16 PM    Bilirubin, direct 0.19 06/16/2021 04:16 PM    Bilirubin, total 0.7 06/16/2021 04:16 PM    Albumin 4.2 06/16/2021 04:16 PM    Protein, total 6.6 06/16/2021 04:16 PM    INR 1.0 08/14/2013 10:57 AM    Prothrombin time 10.3 08/14/2013 10:57 AM    PLATELET 107 28/92/4264 12:55 PM    Hepatitis B surface Ag 0.12 08/28/2013 03:00 PM     Lab Results   Component Value Date/Time    GFR est non-AA 73 09/25/2020 11:03 AM    GFR est AA 84 09/25/2020 11:03 AM    Creatinine 0.81 09/25/2020 11:03 AM    BUN 11 09/25/2020 11:03 AM    Sodium 141 09/25/2020 11:03 AM    Potassium 4.1 09/25/2020 11:03 AM    Chloride 101 09/25/2020 11:03 AM    CO2 27 09/25/2020 11:03 AM    Magnesium 1.8 09/10/2013 04:00 AM    Phosphorus 2.6 07/07/2011 03:20 PM        Objective:     Visit Vitals  /70   Pulse 73   Temp 98.2 °F (36.8 °C) (Temporal)   Resp 20   Ht 5' 1\" (1.549 m)   Wt 169 lb (76.7 kg)   SpO2 98%   BMI 31.93 kg/m²      Appearance: alert, well appearing, and in no distress and oriented to person, place, and time. CVS exam   NECK: supple, no lad, no bruit, no tm  LUNGS: cta bilat  CV rrr, no m/g/r  ABD: soft, nt, nd, nabs  EXT: no c/c/e     Assessment/Plan:   Hyperlipidemia well controlled. current treatment plan is effective, no change in therapy.     Grief reaction with underlying major depression - better. Still has days where she gets teary. Just passed 's birthday. COVID pneumonia - resolved without lingering symptoms. LE edema - venous insufficiency most likely cause  Increase water, decrease salt  Elevate legs   Increase exercise/walking. Hypokalemia during hospitalization - repeat labs. Orders Placed This Encounter    Bilateral Digital Screening Mammography    COLOGUARD TEST (FECAL DNA COLORECTAL CANCER SCREENING)    METABOLIC PANEL, COMPREHENSIVE    cholecalciferol (VITAMIN D3) (5000 Units /125 mcg) capsule    magnesium 250 mg tab    zinc acetate 50 mg (zinc) cap    varicella-zoster recombinant, PF, (Shingrix, PF,) 50 mcg/0.5 mL susr injection       . This is the Subsequent Medicare Annual Wellness Exam, performed 12 months or more after the Initial AWV or the last Subsequent AWV    I have reviewed the patient's medical history in detail and updated the computerized patient record. Assessment/Plan   Education and counseling provided:  Advanced directive discussed with patient. Are appropriate based on today's review and evaluation    1. Pure hypercholesterolemia  2. Medicare annual wellness visit, subsequent  -     varicella-zoster recombinant, PF, (Shingrix, PF,) 50 mcg/0.5 mL susr injection; 0.5mL by IntraMUSCular route once now and then repeat in 2-6 months, Print, Disp-0.5 mL, R-1  3. Screen for colon cancer  -     COLOGUARD TEST (FECAL DNA COLORECTAL CANCER SCREENING)  4. Encounter for screening mammogram for malignant neoplasm of breast  -     [facility] MAMMO BI SCREENING INCL CAD; Future  5. Moderate episode of recurrent major depressive disorder ([facility] [address]  6. Bilateral leg edema  7. Hypokalemia  -     METABOLIC PANEL, COMPREHENSIVE; Future  8.  Advance directive discussed with patient       Depression Risk Factor Screening     3 most recent PHQ Screens 6/21/2021   PHQ Not Done -   Little interest or pleasure in doing things Not at all   Feeling down, depressed, irritable, or hopeless Not at all   Total Score PHQ 2 0       Alcohol Risk Screen    Do you average more than 1 drink per night or more than 7 drinks a week:  No    On any one occasion in the past three months have you have had more than 3 drinks containing alcohol:  No        Functional Ability and Level of Safety    Hearing: Hearing is good. Activities of Daily Living: The home contains: grab bars in bathroom  Patient does total self care      Ambulation: with mild difficulty - right hip and knee pain. Doesn't  toes. Fall Risk:  Fall Risk Assessment, last 12 mths 6/21/2021   Able to walk? Yes   Fall in past 12 months? 1   Do you feel unsteady? 1   Are you worried about falling 0   Number of falls in past 12 months 1   Fall with injury? -   fell on ice in Feb.  Fracture Left elbow. Did not seek treatment - ice, elevated and immobilized. Has xray last month with Dr. Daisy Morales which confirmed healing fracture. Abuse Screen:  Patient is not abused       Cognitive Screening    Has your family/caregiver stated any concerns about your memory: no     Cognitive Screening: Normal - none    Health Maintenance Due     Health Maintenance Due   Topic Date Due    COVID-19 Vaccine (1) Patient declined.       Shingrix Vaccine Age 50> (1 of 2) Prescription given    Colorectal Cancer Screening Combo  Cologuard ordered    Medicare Yearly Exam  06/21/2022    Breast Cancer Screen Mammogram  Ordered today       Patient Care Team   Patient Care Team:  Kaylee Galvin MD as PCP - General (Internal Medicine)  Kaylee Galvin MD as PCP - [address] [place] [address] [facility]  Tulio Dao MD (Orthopedic Surgery)  Joshua Townsend MD (Ophthalmology)    History     Patient Active Problem List   Diagnosis Code    Cervical spinal stenosis M48.02    Severe back pain M54.9    Abnormal urine odor R82.90    Hyperlipidemia E78.5    Depression F32.9    Recurrent UTI N39.0    Copy of advanced directive obtained Z78.9    Goiter, nodular E04.9    Vitamin D deficiency E55.9    Advance directive on file Z78.9    Spontaneous fracture M84.40XA    Primary osteoarthritis of left knee M17.12     Past Medical History:   Diagnosis Date    Anxiety and depression     Calculus     AT AGE 14; NO RECURRENCE    Cataracts, bilateral     Chronic pain     SINCE AGE 20-LOWER BACK INJ.-SKIING ACCIDENT    DJD (degenerative joint disease)     MULTIPLE SITES-KNEES,GREAT TOES,SPINE    H/O scarlet fever     Hypercholesterolemia     Multinodular goiter     Nausea & vomiting     /P HYSTERECTOMY 1980s    Other ill-defined conditions(799.89)     R LOWER LEG ANTERIORLY-NO SENSATION HOT/COL,SHARP/DULL    Pain 5/4/10    ADMITTED FOR INTRACTABLE PAIN--RIGHT SHIN NUMB EVER SINCE    Unspecified adverse effect of anesthesia     NORMAL DOSE VERSED INEFFECTIVE    UTI (lower urinary tract infection)     NONE IN A YEAR; H/O FREQUENT UTIs (4-5 A YEAR)-MACRODANTIN /P HAVING SEX      Past Surgical History:   Procedure Laterality Date    ENDOSCOPY, COLON, DIAGNOSTIC  2/2008    polyp ([place])-f/u 5 yrs.  HX BREAST BIOPSY Right     needle   benign    HX CERVICAL FUSION      HX GI  1993    ENDOSCOPY    HX HEENT  1998    BILAT UPPER & LOWER BLEPHOROPLASTY    HX HYSTERECTOMY  4/1980    WITH ANTERIOR REPAIR    HX ORTHOPAEDIC  X3  LATE 1980s    BASILAR THUMB JOINT REPLACEMENT    HX ORTHOPAEDIC  1995    ORIF L WRIST FRACTURE    HX ORTHOPAEDIC  1995    L WRIST REMOVAL PLATE & SCREWS    HX ORTHOPAEDIC  2006    EXC SRTHRITIC NODULES R GREAT & 2ND TOES    HX ORTHOPAEDIC  2006    BUNIONECTOMY RIGHT    HX ORTHOPAEDIC  07/12    C 4-6 FUSION    HX ORTHOPAEDIC  1978    CARPAL TUNNEL MANAN    HX ORTHOPAEDIC  10/12    LEFT BUNIONECTOMY, GREAT TOE, HAMMERTOE CORRECTION,    HX ORTHOPAEDIC      CORRECTION OF LEFT 3RD TOE AND NEUROMA REMOVAL    HX OTHER SURGICAL  3/2013    Correction of left 3rd toe.     HX TONSILLECTOMY  1952    HX UROLOGICAL  7/09    BLADDER SLING Current Outpatient Medications   Medication Sig Dispense Refill    cholecalciferol (VITAMIN D3) (5000 Units /125 mcg) capsule Take 10,000 Units by mouth.  magnesium 250 mg tab Take 250 mg by mouth daily.  zinc acetate 50 mg (zinc) cap Take 50 mg by mouth daily.  varicella-zoster recombinant, PF, (Shingrix, PF,) 50 mcg/0.5 mL susr injection 0.5mL by IntraMUSCular route once now and then repeat in 2-6 months 0.5 mL 1    meloxicam (MOBIC) 15 mg tablet Take 1 Tab by mouth daily. 90 Tab 1    furosemide (LASIX) 40 mg tablet TAKE ONE-HALF TO ONE TABLET BY MOUTH ONCE DAILY AS NEEDED (SWELLING) (Patient taking differently: Take 40 mg by mouth daily as needed. Swelling.) 90 Tab 3    atorvastatin (LIPITOR) 20 mg tablet Take 1 Tab by mouth daily. 90 Tab 3    estradioL (ESTRACE) 1 mg tablet Take 1 Tab by mouth daily. 90 Tab 3    ALPRAZolam (XANAX) 0.5 mg tablet Take 0.5-1 Tabs by mouth two (2) times daily as needed for Anxiety. Max Daily Amount: 1 mg. 90 Tab 0    MULTIVITS-MIN/IRON/FA/LUTEIN (CENTRUM SILVER WOMEN PO) Take 1 Tab by mouth daily.        Allergies   Allergen Reactions    Epinephrine Shortness of Breath    Adhesive Rash     RED AND PUFFY SKIN    Atorvastatin Myalgia     Other reaction(s): Myalgia    Celebrex [Celecoxib] Other (comments)     INCREASED BP    Mucinex [Guaifenesin] Hives    Niaspan [Niacin] Other (comments)     SEVERE REDNESS, BURNING OF ENTIRE BODY WITH CHILLS    Nystatin Other (comments)       Family History   Problem Relation Age of Onset    Post-op Nausea/Vomiting Mother     Diabetes Mother     Coronary Artery Disease Mother     High Cholesterol Mother     Hypertension Mother     Heart Disease Mother         CARDIOMYOPATHY, CHF    Cancer Father         oat cell of lung    Alcohol abuse Father     Hypertension Sister     Hypertension Brother     High Cholesterol Brother     Other Sister         Sjogren's    Arthritis-osteo Sister     Cancer Maternal Uncle COLON    Cancer Paternal Uncle     Cancer Paternal Uncle         COLON    Liver Disease Son    Judit Burns Syndrome Son     Cancer Paternal Aunt         BLADDER     Social History     Tobacco Use    Smoking status: Former Smoker     Packs/day: 3.00     Years: 20.00     Pack years: 60.00     Quit date: 1983     Years since quittin.0    Smokeless tobacco: Never Used    Tobacco comment: STARTED SMOKING AT AGE 11/ 1PPD BY AGE 14   Substance Use Topics    Alcohol use:  Yes     Alcohol/week: 2.0 - 3.0 standard drinks     Types: 1 Glasses of wine, 1 Shots of liquor per week     Comment: Alex Verdin MD

## 2021-06-21 NOTE — PATIENT INSTRUCTIONS
Medicare Wellness Visit, Female The best way to live healthy is to have a lifestyle where you eat a well-balanced diet, exercise regularly, limit alcohol use, and quit all forms of tobacco/nicotine, if applicable. Regular preventive services are another way to keep healthy. Preventive services (vaccines, screening tests, monitoring & exams) can help personalize your care plan, which helps you manage your own care. Screening tests can find health problems at the earliest stages, when they are easiest to treat. [facility] follows the current, evidence-based guidelines published by the [facility] ([facility]) when recommending preventive services for our patients. Because we follow these guidelines, sometimes recommendations change over time as research supports it. (For example, mammograms used to be recommended annually. Even though Medicare will still pay for an annual mammogram, the newer guidelines recommend a mammogram every two years for women of average risk). Of course, you and your doctor may decide to screen more often for some diseases, based on your risk and your co-morbidities (chronic disease you are already diagnosed with). Preventive services for you include: - Medicare offers their members a free annual wellness visit, which is time for you and your primary care provider to discuss and plan for your preventive service needs. Take advantage of this benefit every year! 
-All adults over the age of 72 should receive the recommended pneumonia vaccines. Current USPSTF guidelines recommend a series of two vaccines for the best pneumonia protection.  
-All adults should have a flu vaccine yearly and a tetanus vaccine every 10 years.  
-All adults age 48 and older should receive the shingles vaccines (series of two vaccines).      
-All adults age 38-68 who are overweight should have a diabetes screening test once every three years.  
-All adults born between [birth year] and 1963 should be screened once for Hepatitis C. 
-Other screening tests and preventive services for persons with diabetes include: an eye exam to screen for diabetic retinopathy, a kidney function test, a foot exam, and stricter control over your cholesterol.  
-Cardiovascular screening for adults with routine risk involves an electrocardiogram (ECG) at intervals determined by your doctor.  
-Colorectal cancer screenings should be done for adults age 54-65 with no increased risk factors for colorectal cancer. There are a number of acceptable methods of screening for this type of cancer. Each test has its own benefits and drawbacks. Discuss with your doctor what is most appropriate for you during your annual wellness visit. The different tests include: colonoscopy (considered the best screening method), a fecal occult blood test, a fecal DNA test, and sigmoidoscopy. 
 
-A bone mass density test is recommended when a woman turns 65 to screen for osteoporosis. This test is only recommended one time, as a screening. Some providers will use this same test as a disease monitoring tool if you already have osteoporosis. -Breast cancer screenings are recommended every other year for women of normal risk, age 54-69. 
-Cervical cancer screenings for women over age 72 are only recommended with certain risk factors. Here is a list of your current Health Maintenance items (your personalized list of preventive services) with a due date: 
 
 
Health Maintenance Due Topic Date Due  
 COVID-19 Vaccine (1) Patient declined.  Shingrix Vaccine Age 50> (1 of 2) Prescription given  Colorectal Cancer Screening Combo  Cologuard ordered  Medicare Yearly Exam  06/21/2022  Breast Cancer Screen Mammogram  Ordered today

## 2021-07-25 RX ORDER — MELOXICAM 15 MG/1
15 TABLET ORAL DAILY
Qty: 90 TABLET | Refills: 1 | Status: SHIPPED | OUTPATIENT
Start: 2021-07-25 | End: 2021-11-18

## 2021-09-16 RX ORDER — ESTRADIOL 1 MG/1
TABLET ORAL
Qty: 90 TABLET | Refills: 3 | Status: SHIPPED | OUTPATIENT
Start: 2021-09-16 | End: 2022-01-10 | Stop reason: SDUPTHER

## 2021-11-18 RX ORDER — MELOXICAM 15 MG/1
TABLET ORAL
Qty: 90 TABLET | Refills: 1 | Status: SHIPPED | OUTPATIENT
Start: 2021-11-18 | End: 2022-01-10 | Stop reason: SDUPTHER

## 2022-01-10 ENCOUNTER — OFFICE VISIT (OUTPATIENT)
Dept: INTERNAL MEDICINE CLINIC | Age: 77
End: 2022-01-10
Payer: MEDICARE

## 2022-01-10 VITALS
OXYGEN SATURATION: 99 % | BODY MASS INDEX: 30.29 KG/M2 | HEART RATE: 78 BPM | RESPIRATION RATE: 14 BRPM | HEIGHT: 61 IN | TEMPERATURE: 98.3 F | SYSTOLIC BLOOD PRESSURE: 134 MMHG | WEIGHT: 160.4 LBS | DIASTOLIC BLOOD PRESSURE: 76 MMHG

## 2022-01-10 DIAGNOSIS — R53.82 CHRONIC FATIGUE: ICD-10-CM

## 2022-01-10 DIAGNOSIS — E78.00 PURE HYPERCHOLESTEROLEMIA: Primary | ICD-10-CM

## 2022-01-10 DIAGNOSIS — R29.898 BILATERAL LEG WEAKNESS: ICD-10-CM

## 2022-01-10 DIAGNOSIS — F33.1 MODERATE EPISODE OF RECURRENT MAJOR DEPRESSIVE DISORDER (HCC): ICD-10-CM

## 2022-01-10 DIAGNOSIS — E55.9 VITAMIN D DEFICIENCY: ICD-10-CM

## 2022-01-10 PROCEDURE — G0463 HOSPITAL OUTPT CLINIC VISIT: HCPCS | Performed by: INTERNAL MEDICINE

## 2022-01-10 PROCEDURE — 1101F PT FALLS ASSESS-DOCD LE1/YR: CPT | Performed by: INTERNAL MEDICINE

## 2022-01-10 PROCEDURE — G8399 PT W/DXA RESULTS DOCUMENT: HCPCS | Performed by: INTERNAL MEDICINE

## 2022-01-10 PROCEDURE — G8536 NO DOC ELDER MAL SCRN: HCPCS | Performed by: INTERNAL MEDICINE

## 2022-01-10 PROCEDURE — G8417 CALC BMI ABV UP PARAM F/U: HCPCS | Performed by: INTERNAL MEDICINE

## 2022-01-10 PROCEDURE — 3017F COLORECTAL CA SCREEN DOC REV: CPT | Performed by: INTERNAL MEDICINE

## 2022-01-10 PROCEDURE — G8427 DOCREV CUR MEDS BY ELIG CLIN: HCPCS | Performed by: INTERNAL MEDICINE

## 2022-01-10 PROCEDURE — 99214 OFFICE O/P EST MOD 30 MIN: CPT | Performed by: INTERNAL MEDICINE

## 2022-01-10 PROCEDURE — 1090F PRES/ABSN URINE INCON ASSESS: CPT | Performed by: INTERNAL MEDICINE

## 2022-01-10 PROCEDURE — G9717 DOC PT DX DEP/BP F/U NT REQ: HCPCS | Performed by: INTERNAL MEDICINE

## 2022-01-10 PROCEDURE — G9899 SCRN MAM PERF RSLTS DOC: HCPCS | Performed by: INTERNAL MEDICINE

## 2022-01-10 RX ORDER — ESTRADIOL 1 MG/1
TABLET ORAL
Qty: 90 TABLET | Refills: 3 | Status: SHIPPED | OUTPATIENT
Start: 2022-01-10 | End: 2022-07-11 | Stop reason: SDUPTHER

## 2022-01-10 RX ORDER — ZINC GLUCONATE 10 MG
250 LOZENGE ORAL DAILY
COMMUNITY

## 2022-01-10 RX ORDER — BUPIVACAINE HYDROCHLORIDE 5 MG/ML
1 INJECTION, SOLUTION PERINEURAL
COMMUNITY
Start: 2022-01-10 | End: 2022-01-10 | Stop reason: ALTCHOICE

## 2022-01-10 RX ORDER — LIDOCAINE HYDROCHLORIDE 10 MG/ML
1 INJECTION, SOLUTION EPIDURAL; INFILTRATION; INTRACAUDAL; PERINEURAL
COMMUNITY
Start: 2022-01-10 | End: 2022-01-10 | Stop reason: ALTCHOICE

## 2022-01-10 RX ORDER — MELOXICAM 15 MG/1
TABLET ORAL
Qty: 90 TABLET | Refills: 1 | Status: SHIPPED | OUTPATIENT
Start: 2022-01-10 | End: 2022-07-11 | Stop reason: SDUPTHER

## 2022-01-10 RX ORDER — ATORVASTATIN CALCIUM 20 MG/1
20 TABLET, FILM COATED ORAL DAILY
Qty: 90 TABLET | Refills: 3 | Status: SHIPPED | OUTPATIENT
Start: 2022-01-10 | End: 2022-07-11 | Stop reason: SDUPTHER

## 2022-01-10 NOTE — PROGRESS NOTES
Reviewed record in preparation for visit and have obtained necessary documentation. Identified pt with two pt identifiers(name and ). Chief Complaint   Patient presents with    Follow-up     Blood pressure 134/76, pulse 78, temperature 98.3 °F (36.8 °C), temperature source Temporal, resp. rate 14, height 5' 1\" (1.549 m), weight 160 lb 6.4 oz (72.8 kg), SpO2 99 %. Health Maintenance Due   Topic Date Due    COVID-19 Vaccine (1) Never done    Shingles Vaccine (1 of 2) Never done    Mammogram  10/24/2020    DTaP/Tdap/Td  (2 - Td or Tdap) 2021    Colorectal Screening  2021    Yearly Flu Vaccine (1) 2021       Ms. Quyen Blevins has a reminder for a \"due or due soon\" health maintenance. I have asked that she discuss this further with her primary care provider for follow-up on this health maintenance. Coordination of Care Questionnaire:  :     1) Have you been to an emergency room, urgent care clinic since your last visit? no   Hospitalized since your last visit? no             2) Have you seen or consulted any other health care providers outside of [address] [place] [address] since your last visit? no  (Include any pap smears or colon screenings in this section.)    3) In the event something were to happen to you and you were unable to speak on your behalf, do you have an Advance Directive/ Living Will in place stating your wishes?  NO

## 2022-01-10 NOTE — PROGRESS NOTES
Subjective: Nancy Mccartney is a 78 y.o. female who is seen for follow up of hyperlipidemia. Cardiovascular risk analysis - 78 y.o. female LDL goal is under 130. Compliance with treatment thus far has been good. ROS: taking medications as instructed, no medication side effects noted, no TIA's, no chest pain on exertion, no dyspnea on exertion, no swelling of ankles, no orthostatic dizziness or lightheadedness, no palpitations. New concerns:   Grief reaction - Doing better. Able to spend time with family over holidays. Working officially 3 days but adds in extra days. Tired all the time. Could go back to sleep in am.  Can't stay awake past 7 or 8. Would like to go to sleep at 6. Up at 4:30 am to get to work at 6:30. Mouth is dry in the am.  Sleeps on her back. Has never had sleep apnea testing. Fine walking, standing for long periods of time and completing ADLS. ALot of baking over the Holidays without issues. Feels can't  her feet. Tripping over her toes. Feels she is losing strength in her legs. No numbness or tingling. Hx of back surgery. No issues with arms. Cannot run. Had TKR Oct 1st.  Much stronger since that time. Mentioned leg weakness to Dr. Hoa Damian, recommended seeing neurologist at [facility] but pt declined. mortons neuroma right foot injected by Dr. Hoa Damian. Also injecting left hip for hip bursitis. Right leg is shorter than left. COVID spring 2021. Declines flu shot, COVID vaccine, mammogram    Current Outpatient Medications   Medication Sig Dispense Refill    meloxicam (MOBIC) 15 mg tablet TAKE 1 TABLET DAILY 90 Tablet 1    estradioL (ESTRACE) 1 mg tablet TAKE 1 TABLET DAILY 90 Tablet 3    cholecalciferol (VITAMIN D3) (5000 Units /125 mcg) capsule Take 10,000 Units by mouth.  magnesium 250 mg tab Take 250 mg by mouth daily.  zinc acetate 50 mg (zinc) cap Take 50 mg by mouth daily.       varicella-zoster recombinant, PF, (Shingrix, PF,) 50 mcg/0.5 mL susr injection 0.5mL by IntraMUSCular route once now and then repeat in 2-6 months 0.5 mL 1    furosemide (LASIX) 40 mg tablet TAKE ONE-HALF TO ONE TABLET BY MOUTH ONCE DAILY AS NEEDED (SWELLING) (Patient taking differently: Take 40 mg by mouth daily as needed. Swelling.) 90 Tab 3    atorvastatin (LIPITOR) 20 mg tablet Take 1 Tab by mouth daily. 90 Tab 3    ALPRAZolam (XANAX) 0.5 mg tablet Take 0.5-1 Tabs by mouth two (2) times daily as needed for Anxiety. Max Daily Amount: 1 mg. 90 Tab 0    MULTIVITS-MIN/IRON/FA/LUTEIN (CENTRUM SILVER WOMEN PO) Take 1 Tab by mouth daily. Lab Results   Component Value Date/Time    Cholesterol, total 201 (H) 06/16/2021 04:16 PM    HDL Cholesterol 69 06/16/2021 04:16 PM    LDL, calculated 111 (H) 06/16/2021 04:16 PM    LDL, calculated 99.8 03/16/2020 11:36 AM    Triglyceride 123 06/16/2021 04:16 PM    CHOL/HDL Ratio 2.9 03/16/2020 11:36 AM     Lab Results   Component Value Date/Time    ALT (SGPT) 22 06/21/2021 09:53 AM    Alk. phosphatase 58 06/21/2021 09:53 AM    Bilirubin, direct 0.19 06/16/2021 04:16 PM    Bilirubin, total 0.5 06/21/2021 09:53 AM    Albumin 3.3 (L) 06/21/2021 09:53 AM    Protein, total 6.7 06/21/2021 09:53 AM    INR 1.0 08/14/2013 10:57 AM    Prothrombin time 10.3 08/14/2013 10:57 AM    PLATELET 807 56/36/3736 12:55 PM    Hepatitis B surface Ag 0.12 08/28/2013 03:00 PM     Lab Results   Component Value Date/Time    GFR est non-AA >60 06/21/2021 09:53 AM    GFR est AA >60 06/21/2021 09:53 AM    Creatinine 0.59 06/21/2021 09:53 AM    BUN 20 06/21/2021 09:53 AM    Sodium 139 06/21/2021 09:53 AM    Potassium 4.0 06/21/2021 09:53 AM    Chloride 109 (H) 06/21/2021 09:53 AM    CO2 26 06/21/2021 09:53 AM    Magnesium 1.8 09/10/2013 04:00 AM    Phosphorus 2.6 07/07/2011 03:20 PM        Objective: There were no vitals taken for this visit. Appearance: alert, well appearing, and in no distress and oriented to person, place, and time.   CVS exam   .NECK: supple, no lad, no bruit, no tm  LUNGS: cta bilat  CV rrr, no m/g/r  ABD: soft, nt, nd, nabs  EXT: no c/c/e, mild weakness left leg compared to right. nml ankle reflexes bilat. Assessment/Plan:   Hyperlipidemia well controlled. current treatment plan is effective, no change in therapy. Check labs,    Grief reaction - improved. Continue same meds    Fatigue - ? ABHIJIT vs other causes. Will check labs. Declines sleep referral at this time but may consider lab testing negative for a cause    Leg weakness - -mild on left with testing. Discussed possibly seeing neurologist in [place] but pt declines at this time. Encouraged to continue walking. F/u Dr Robert Davenport prn. Vit d def - continue supplements, repeat labs. Orders Placed This Encounter    METABOLIC PANEL, COMPREHENSIVE    LIPID PANEL    CBC W/O DIFF    TSH 3RD GENERATION    VITAMIN D, 25 HYDROXY    DISCONTD: triamcinolone acetonide 40 mg/mL kit    magnesium 250 mg tab    DISCONTD: bupivacaine HCl (MARCAINE) 0.5 % (5 mg/mL) soln injection    DISCONTD: lidocaine, PF, (XYLOCAINE) 10 mg/mL (1 %) injection    meloxicam (MOBIC) 15 mg tablet    estradioL (ESTRACE) 1 mg tablet    atorvastatin (LIPITOR) 20 mg tablet     Follow-up and Dispositions    · Return in about 6 months (around 7/10/2022) for hyperlipidemia. Cedric Frank

## 2022-01-28 LAB
25(OH)D3+25(OH)D2 SERPL-MCNC: 82.2 NG/ML (ref 30–100)
ALBUMIN SERPL-MCNC: 4.1 G/DL (ref 3.7–4.7)
ALBUMIN/GLOB SERPL: 1.8 {RATIO} (ref 1.2–2.2)
ALP SERPL-CCNC: 58 IU/L (ref 44–121)
ALT SERPL-CCNC: 32 IU/L (ref 0–32)
AST SERPL-CCNC: 31 IU/L (ref 0–40)
BILIRUB SERPL-MCNC: 0.6 MG/DL (ref 0–1.2)
BUN SERPL-MCNC: 18 MG/DL (ref 8–27)
BUN/CREAT SERPL: 23 (ref 12–28)
CALCIUM SERPL-MCNC: 9.3 MG/DL (ref 8.7–10.3)
CHLORIDE SERPL-SCNC: 103 MMOL/L (ref 96–106)
CHOLEST SERPL-MCNC: 188 MG/DL (ref 100–199)
CO2 SERPL-SCNC: 27 MMOL/L (ref 20–29)
CREAT SERPL-MCNC: 0.79 MG/DL (ref 0.57–1)
ERYTHROCYTE [DISTWIDTH] IN BLOOD BY AUTOMATED COUNT: 12.6 % (ref 11.7–15.4)
GLOBULIN SER CALC-MCNC: 2.3 G/DL (ref 1.5–4.5)
GLUCOSE SERPL-MCNC: 89 MG/DL (ref 65–99)
HCT VFR BLD AUTO: 44.1 % (ref 34–46.6)
HDLC SERPL-MCNC: 70 MG/DL
HGB BLD-MCNC: 15.2 G/DL (ref 11.1–15.9)
IMP & REVIEW OF LAB RESULTS: NORMAL
LDLC SERPL CALC-MCNC: 101 MG/DL (ref 0–99)
MCH RBC QN AUTO: 34 PG (ref 26.6–33)
MCHC RBC AUTO-ENTMCNC: 34.5 G/DL (ref 31.5–35.7)
MCV RBC AUTO: 99 FL (ref 79–97)
PLATELET # BLD AUTO: 243 X10E3/UL (ref 150–450)
POTASSIUM SERPL-SCNC: 3.9 MMOL/L (ref 3.5–5.2)
PROT SERPL-MCNC: 6.4 G/DL (ref 6–8.5)
RBC # BLD AUTO: 4.47 X10E6/UL (ref 3.77–5.28)
SODIUM SERPL-SCNC: 140 MMOL/L (ref 134–144)
TRIGL SERPL-MCNC: 97 MG/DL (ref 0–149)
TSH SERPL-ACNC: 1.63 UIU/ML (ref 0.45–4.5)
VLDLC SERPL CALC-MCNC: 17 MG/DL (ref 5–40)
WBC # BLD AUTO: 6.1 X10E3/UL (ref 3.4–10.8)

## 2022-03-19 PROBLEM — Z78.9 ADVANCE DIRECTIVE ON FILE: Status: ACTIVE | Noted: 2018-09-19

## 2022-03-19 PROBLEM — M84.40XA SPONTANEOUS FRACTURE: Status: ACTIVE | Noted: 2017-07-24

## 2022-03-19 PROBLEM — M17.12 PRIMARY OSTEOARTHRITIS OF LEFT KNEE: Status: ACTIVE | Noted: 2017-07-24

## 2022-06-23 ENCOUNTER — TRANSCRIBE ORDER (OUTPATIENT)
Dept: SCHEDULING | Age: 77
End: 2022-06-23

## 2022-06-23 DIAGNOSIS — Z12.31 SCREENING MAMMOGRAM FOR HIGH-RISK PATIENT: Primary | ICD-10-CM

## 2022-07-10 NOTE — PROGRESS NOTES
Subjective: Geraldine Stover is a 78 y.o. female who is seen for follow up of hyperlipidemia. Cardiovascular risk analysis - 78 y.o. female LDL goal is under 130. Compliance with treatment thus far has been good. ROS: taking medications as instructed, no medication side effects noted, no TIA's, no chest pain on exertion, no dyspnea on exertion, some swelling of ankles, no orthostatic dizziness or lightheadedness. New concerns:   Grief reaction/depression - grief is better. Feels not depressed. Admits to increased stress  Weakness in legs -worse with knee pain   Lots of arthritis pain in feet, knees and hands. Not using any otc meds, hot or cold. Tried biofreeze which helped but only short lived. Has f/u with Dr. Summer Pike today. Taking Mobic 15mg daily. Feels Tylenol does not help. In the process of having upper teeth pulled and dentures placed. Would have to wait 6 months prior to additional surgery. Taking 40mg Lasix daily which is helping with her leg and feet swelling. Increased work - had been working M[employer] 7 hours a day, increased work to 5-6 days/week. Current Outpatient Medications   Medication Sig Dispense Refill    meloxicam (MOBIC) 15 mg tablet TAKE 1 TABLET DAILY 90 Tablet 1    atorvastatin (LIPITOR) 20 mg tablet Take 1 Tablet by mouth daily. 90 Tablet 3    estradioL (ESTRACE) 1 mg tablet TAKE 1 TABLET DAILY 90 Tablet 3    magnesium 250 mg tab Take 250 mg by mouth daily.  cholecalciferol (VITAMIN D3) (5000 Units /125 mcg) capsule Take 10,000 Units by mouth.  zinc acetate 50 mg (zinc) cap Take 50 mg by mouth daily.  varicella-zoster recombinant, PF, (Shingrix, PF,) 50 mcg/0.5 mL susr injection 0.5mL by IntraMUSCular route once now and then repeat in 2-6 months 0.5 mL 1    furosemide (LASIX) 40 mg tablet TAKE ONE-HALF TO ONE TABLET BY MOUTH ONCE DAILY AS NEEDED (SWELLING) (Patient taking differently: Take 40 mg by mouth daily as needed.  Swelling.) 90 Tab 3    ALPRAZolam (XANAX) 0.5 mg tablet Take 0.5-1 Tabs by mouth two (2) times daily as needed for Anxiety. Max Daily Amount: 1 mg. (Patient taking differently: Take 0.25-0.5 mg by mouth two (2) times daily as needed for Anxiety. Patient stated she is taking 0.5 mg at bedtime) 90 Tab 0    MULTIVITS-MIN/IRON/FA/LUTEIN (CENTRUM SILVER WOMEN PO) Take 1 Tab by mouth daily. Lab Results   Component Value Date/Time    Cholesterol, total 188 01/27/2022 09:41 AM    HDL Cholesterol 70 01/27/2022 09:41 AM    LDL, calculated 101 (H) 01/27/2022 09:41 AM    LDL, calculated 99.8 03/16/2020 11:36 AM    Triglyceride 97 01/27/2022 09:41 AM    CHOL/HDL Ratio 2.9 03/16/2020 11:36 AM     Lab Results   Component Value Date/Time    ALT (SGPT) 32 01/27/2022 09:41 AM    Alk. phosphatase 58 01/27/2022 09:41 AM    Bilirubin, direct 0.19 06/16/2021 04:16 PM    Bilirubin, total 0.6 01/27/2022 09:41 AM    Albumin 4.1 01/27/2022 09:41 AM    Protein, total 6.4 01/27/2022 09:41 AM    INR 1.0 08/14/2013 10:57 AM    Prothrombin time 10.3 08/14/2013 10:57 AM    PLATELET 246 53/85/3147 09:41 AM    Hepatitis B surface Ag 0.12 08/28/2013 03:00 PM        Objective:     Visit Vitals  BP (!) 153/79 (BP 1 Location: Right arm, BP Patient Position: Sitting, BP Cuff Size: Adult)   Pulse 73   Temp 98 °F (36.7 °C) (Oral)   Resp 18   Ht 5' 1\" (1.549 m)   Wt 174 lb (78.9 kg)   SpO2 99%   BMI 32.88 kg/m²      Appearance: alert, well appearing, and in no distress and oriented to person, place, and time. CVS exam   .NECK: supple, no lad, no bruit, no tm  LUNGS: cta bilat  CV rrr, no m/g/r  ABD: soft, nt, nd, nabs  EXT: no c/c. Trace le edema, left > right  MS: enlargement bilat 2nd MCP joints, knees with decreased rom and crepitance, bony enlargement      Assessment/Plan:   Hyperlipidemia well controlled. current treatment plan is effective, no change in therapy. LE edema- continue lasix. OA contributing    OA knees - referred back to Dr. Mary Jo Estrada.   Continue Meloxicam.      Depression/stress - discussed. Orders Placed This Encounter    METABOLIC PANEL, COMPREHENSIVE    LIPID PANEL    meloxicam (MOBIC) 15 mg tablet    atorvastatin (LIPITOR) 20 mg tablet    estradioL (ESTRACE) 1 mg tablet     Follow-up and Dispositions    · Return in about 6 months (around 1/11/2023). .  This is the Subsequent Medicare Annual Wellness Exam, performed 12 months or more after the Initial AWV or the last Subsequent AWV    I have reviewed the patient's medical history in detail and updated the computerized patient record. Assessment/Plan   Education and counseling provided:  Are appropriate based on today's review and evaluation  Advanced directive discussed with patient today. 1. Pure hypercholesterolemia  -     METABOLIC PANEL, COMPREHENSIVE; Future  -     LIPID PANEL; Future  2. Moderate episode of recurrent major depressive disorder ([facility] [address]  3. Medicare annual wellness visit, subsequent  4. Primary osteoarthritis of both knees  5. Leg edema       Depression Risk Factor Screening     3 most recent PHQ Screens 7/11/2022   PHQ Not Done -   Little interest or pleasure in doing things Not at all   Feeling down, depressed, irritable, or hopeless Not at all   Total Score PHQ 2 0       Alcohol & Drug Abuse Risk Screen    Do you average more than 1 drink per night or more than 7 drinks a week:  Yes    On any one occasion in the past three months have you have had more than 3 drinks containing alcohol:  No          Functional Ability and Level of Safety    Hearing: Hearing is good. Activities of Daily Living: The home contains: handrails  Patient does total self care      Ambulation:with difficulty secondary to knees. Not using a cane or walker. Fall Risk:  Fall Risk Assessment, last 12 mths 7/11/2022   Able to walk? Yes   Fall in past 12 months? 0   Do you feel unsteady? 1   Are you worried about falling 1   Number of falls in past 12 months -   Fall with injury?  - Abuse Screen:  Patient is not abused       Cognitive Screening    Has your family/caregiver stated any concerns about your memory: no     Cognitive Screening: Normal -      Health Maintenance Due     Health Maintenance Due   Topic Date Due    COVID-19 Vaccine (1) Never done    Shingrix Vaccine Age 50> (1 of 2) Never done    Breast Cancer Screen Mammogram  10/24/2020    DTaP/Tdap/Td series (2 - Td or Tdap) August 2023    Depression Monitoring  Completed today    Medicare Yearly Exam  07/11/2023       Patient Care Team   Patient Care Team:  Hue Page MD as PCP - General (Internal Medicine Physician)  Hue Page MD as PCP [facility]  Dale Sarah MD (Orthopedic Surgery)  Jayda Walters MD (Ophthalmology)    History     Patient Active Problem List   Diagnosis Code    Cervical spinal stenosis M48.02    Severe back pain M54.9    Abnormal urine odor R82.90    Hyperlipidemia E78.5    Depression F32. A    Recurrent UTI N39.0    Copy of advanced directive obtained Z78.9    Goiter, nodular E04.9    Vitamin D deficiency E55.9    Advance directive on file Z78.9    Spontaneous fracture M84.40XA    Primary osteoarthritis of left knee M17.12     Past Medical History:   Diagnosis Date    Anxiety and depression     Calculus     AT AGE 18; NO RECURRENCE    Cataracts, bilateral     Chronic pain     SINCE AGE 20-LOWER BACK INJ.-SKIING ACCIDENT    DJD (degenerative joint disease)     MULTIPLE SITES-KNEES,GREAT TOES,SPINE    H/O scarlet fever     Hypercholesterolemia     Multinodular goiter     Nausea & vomiting     /P HYSTERECTOMY 1980s    Other ill-defined conditions(799.89)     R LOWER LEG ANTERIORLY-NO SENSATION HOT/COL,SHARP/DULL    Pain 5/4/10    ADMITTED FOR INTRACTABLE PAIN--RIGHT SHIN NUMB EVER SINCE    Unspecified adverse effect of anesthesia     NORMAL DOSE VERSED INEFFECTIVE    UTI (lower urinary tract infection)     NONE IN A YEAR; H/O FREQUENT UTIs (4-5 A YEAR)-MACRODANTIN /P HAVING SEX      Past Surgical History:   Procedure Laterality Date    ENDOSCOPY, COLON, DIAGNOSTIC  2/2008    polyp ([place])-f/u 5 yrs.  HX BREAST BIOPSY Right     needle   benign    HX CERVICAL FUSION      HX GI  1993    ENDOSCOPY    HX HEENT  1998    BILAT UPPER & LOWER BLEPHOROPLASTY    HX HYSTERECTOMY  4/1980    WITH ANTERIOR REPAIR    HX ORTHOPAEDIC  X3  LATE 1980s    BASILAR THUMB JOINT REPLACEMENT    HX ORTHOPAEDIC  1995    ORIF L WRIST FRACTURE    HX ORTHOPAEDIC  1995    L WRIST REMOVAL PLATE & SCREWS    HX ORTHOPAEDIC  2006    EXC SRTHRITIC NODULES R GREAT & 2ND TOES    HX ORTHOPAEDIC  2006    BUNIONECTOMY RIGHT    HX ORTHOPAEDIC  07/12    C 4-6 FUSION    HX ORTHOPAEDIC  1978    CARPAL TUNNEL MANAN    HX ORTHOPAEDIC  10/12    LEFT BUNIONECTOMY, GREAT TOE, HAMMERTOE CORRECTION,    HX ORTHOPAEDIC      CORRECTION OF LEFT 3RD TOE AND NEUROMA REMOVAL    HX OTHER SURGICAL  3/2013    Correction of left 3rd toe.  HX TONSILLECTOMY  1952    HX UROLOGICAL  7/09    BLADDER SLING     Current Outpatient Medications   Medication Sig Dispense Refill    meloxicam (MOBIC) 15 mg tablet TAKE 1 TABLET DAILY 90 Tablet 1    atorvastatin (LIPITOR) 20 mg tablet Take 1 Tablet by mouth daily. 90 Tablet 3    estradioL (ESTRACE) 1 mg tablet TAKE 1 TABLET DAILY 90 Tablet 3    magnesium 250 mg tab Take 250 mg by mouth daily.  cholecalciferol (VITAMIN D3) (5000 Units /125 mcg) capsule Take 10,000 Units by mouth.  zinc acetate 50 mg (zinc) cap Take 50 mg by mouth daily.  varicella-zoster recombinant, PF, (Shingrix, PF,) 50 mcg/0.5 mL susr injection 0.5mL by IntraMUSCular route once now and then repeat in 2-6 months 0.5 mL 1    furosemide (LASIX) 40 mg tablet TAKE ONE-HALF TO ONE TABLET BY MOUTH ONCE DAILY AS NEEDED (SWELLING) (Patient taking differently: Take 40 mg by mouth daily as needed.  Swelling.) 90 Tab 3    ALPRAZolam (XANAX) 0.5 mg tablet Take 0.5-1 Tabs by mouth two (2) times daily as needed for Anxiety. Max Daily Amount: 1 mg. (Patient taking differently: Take 0.25-0.5 mg by mouth two (2) times daily as needed for Anxiety. Patient stated she is taking 0.5 mg at bedtime) 90 Tab 0    MULTIVITS-MIN/IRON/FA/LUTEIN (CENTRUM SILVER WOMEN PO) Take 1 Tab by mouth daily. Allergies   Allergen Reactions    Epinephrine Shortness of Breath    Adhesive Rash     RED AND PUFFY SKIN    Atorvastatin Myalgia     Other reaction(s): Myalgia    Celebrex [Celecoxib] Other (comments)     INCREASED BP    Mucinex [Guaifenesin] Hives    Niaspan [Niacin] Other (comments)     SEVERE REDNESS, BURNING OF ENTIRE BODY WITH CHILLS    Nystatin Other (comments)       Family History   Problem Relation Age of Onset    Post-op Nausea/Vomiting Mother     Diabetes Mother     Coronary Art Dis Mother     High Cholesterol Mother     Hypertension Mother     Heart Disease Mother         CARDIOMYOPATHY, CHF    Cancer Father         oat cell of lung    Alcohol abuse Father     Hypertension Sister     Hypertension Brother     High Cholesterol Brother     Other Sister         Sjogren's    OSTEOARTHRITIS Sister     Cancer Maternal Uncle         COLON    Cancer Paternal Uncle     Cancer Paternal Uncle         COLON    Liver Disease Son    Francena Vadim Syndrome Son     Cancer Paternal Aunt         BLADDER     Social History     Tobacco Use    Smoking status: Former Smoker     Packs/day: 3.00     Years: 20.00     Pack years: 60.00     Quit date: 1983     Years since quittin.0    Smokeless tobacco: Never Used    Tobacco comment: STARTED SMOKING AT AGE 11/ 1PPD BY AGE 14   Substance Use Topics    Alcohol use:  Yes     Alcohol/week: 2.0 - 3.0 standard drinks     Types: 1 Glasses of wine, 1 Shots of liquor per week     Comment: Shane Melgar MD

## 2022-07-11 ENCOUNTER — OFFICE VISIT (OUTPATIENT)
Dept: INTERNAL MEDICINE CLINIC | Age: 77
End: 2022-07-11
Payer: COMMERCIAL

## 2022-07-11 VITALS
DIASTOLIC BLOOD PRESSURE: 79 MMHG | HEIGHT: 61 IN | RESPIRATION RATE: 18 BRPM | WEIGHT: 174 LBS | SYSTOLIC BLOOD PRESSURE: 153 MMHG | HEART RATE: 73 BPM | OXYGEN SATURATION: 99 % | BODY MASS INDEX: 32.85 KG/M2 | TEMPERATURE: 98 F

## 2022-07-11 DIAGNOSIS — M17.0 PRIMARY OSTEOARTHRITIS OF BOTH KNEES: ICD-10-CM

## 2022-07-11 DIAGNOSIS — R60.0 LEG EDEMA: ICD-10-CM

## 2022-07-11 DIAGNOSIS — F33.1 MODERATE EPISODE OF RECURRENT MAJOR DEPRESSIVE DISORDER (HCC): ICD-10-CM

## 2022-07-11 DIAGNOSIS — E78.00 PURE HYPERCHOLESTEROLEMIA: Primary | ICD-10-CM

## 2022-07-11 DIAGNOSIS — Z00.00 MEDICARE ANNUAL WELLNESS VISIT, SUBSEQUENT: ICD-10-CM

## 2022-07-11 PROCEDURE — G8399 PT W/DXA RESULTS DOCUMENT: HCPCS | Performed by: INTERNAL MEDICINE

## 2022-07-11 PROCEDURE — G8536 NO DOC ELDER MAL SCRN: HCPCS | Performed by: INTERNAL MEDICINE

## 2022-07-11 PROCEDURE — G9899 SCRN MAM PERF RSLTS DOC: HCPCS | Performed by: INTERNAL MEDICINE

## 2022-07-11 PROCEDURE — 1101F PT FALLS ASSESS-DOCD LE1/YR: CPT | Performed by: INTERNAL MEDICINE

## 2022-07-11 PROCEDURE — G9717 DOC PT DX DEP/BP F/U NT REQ: HCPCS | Performed by: INTERNAL MEDICINE

## 2022-07-11 PROCEDURE — G0439 PPPS, SUBSEQ VISIT: HCPCS | Performed by: INTERNAL MEDICINE

## 2022-07-11 PROCEDURE — 1090F PRES/ABSN URINE INCON ASSESS: CPT | Performed by: INTERNAL MEDICINE

## 2022-07-11 PROCEDURE — G8427 DOCREV CUR MEDS BY ELIG CLIN: HCPCS | Performed by: INTERNAL MEDICINE

## 2022-07-11 PROCEDURE — 3017F COLORECTAL CA SCREEN DOC REV: CPT | Performed by: INTERNAL MEDICINE

## 2022-07-11 PROCEDURE — 99214 OFFICE O/P EST MOD 30 MIN: CPT | Performed by: INTERNAL MEDICINE

## 2022-07-11 PROCEDURE — G8417 CALC BMI ABV UP PARAM F/U: HCPCS | Performed by: INTERNAL MEDICINE

## 2022-07-11 RX ORDER — ESTRADIOL 1 MG/1
TABLET ORAL
Qty: 90 TABLET | Refills: 3 | Status: SHIPPED | OUTPATIENT
Start: 2022-07-11

## 2022-07-11 RX ORDER — ATORVASTATIN CALCIUM 20 MG/1
20 TABLET, FILM COATED ORAL DAILY
Qty: 90 TABLET | Refills: 3 | Status: SHIPPED | OUTPATIENT
Start: 2022-07-11

## 2022-07-11 RX ORDER — MELOXICAM 15 MG/1
TABLET ORAL
Qty: 90 TABLET | Refills: 1 | Status: SHIPPED | OUTPATIENT
Start: 2022-07-11

## 2022-07-11 NOTE — PROGRESS NOTES
Identified pt with two pt identifiers(name and ). Reviewed record in preparation for visit and have obtained necessary documentation. All patient medications has been reviewed. Chief Complaint   Patient presents with    Follow-up     Patient stated her arthritis has flaired up over the last 3 months        3 most recent PHQ Screens 2022   PHQ Not Done -   Little interest or pleasure in doing things Not at all   Feeling down, depressed, irritable, or hopeless Not at all   Total Score PHQ 2 0     Abuse Screening Questionnaire 2019   Do you ever feel afraid of your partner? N   Are you in a relationship with someone who physically or mentally threatens you? N   Is it safe for you to go home? Y       Health Maintenance Due   Topic    COVID-19 Vaccine (1)    Shingrix Vaccine Age 49> (1 of 2)    Breast Cancer Screen Mammogram     DTaP/Tdap/Td series (2 - Td or Tdap)    Depression Monitoring     Medicare Yearly Exam      Health Maintenance Review: Patient reminded of \"due or due soon\" health maintenance. I have asked the patient to contact his/her primary care provider (PCP) for follow-up on his/her health maintenance. Vitals:    22 0902   BP: (!) 153/79   Pulse: 73   Resp: 18   Temp: 98 °F (36.7 °C)   TempSrc: Oral   SpO2: 99%   Weight: 174 lb (78.9 kg)   Height: 5' 1\" (1.549 m)   PainSc:   8   PainLoc: Hip       Wt Readings from Last 3 Encounters:   22 174 lb (78.9 kg)   01/10/22 160 lb 6.4 oz (72.8 kg)   21 169 lb (76.7 kg)     Temp Readings from Last 3 Encounters:   22 98 °F (36.7 °C) (Oral)   01/10/22 98.3 °F (36.8 °C) (Temporal)   21 98.2 °F (36.8 °C) (Temporal)     BP Readings from Last 3 Encounters:   22 (!) 153/79   01/10/22 134/76   21 134/70     Pulse Readings from Last 3 Encounters:   22 73   01/10/22 78   21 73       1. \"Have you been to the ER, urgent care clinic since your last visit? Hospitalized since your last visit? \" No    2. \"Have you seen or consulted any other health care providers outside of the [address] [place] [address] since your last visit? \" No     3. For patients aged 39-70: Has the patient had a colonoscopy / FIT/ Cologuard? No      If the patient is female:    4. For patients aged 41-77: Has the patient had a mammogram within the past 2 years? No      5. For patients aged 21-65: Has the patient had a pap smear?  No

## 2022-07-11 NOTE — PATIENT INSTRUCTIONS
Medicare Wellness Visit, Female     The best way to live healthy is to have a lifestyle where you eat a well-balanced diet, exercise regularly, limit alcohol use, and quit all forms of tobacco/nicotine, if applicable. Regular preventive services are another way to keep healthy. Preventive services (vaccines, screening tests, monitoring & exams) can help personalize your care plan, which helps you manage your own care. Screening tests can find health problems at the earliest stages, when they are easiest to treat. [facility] follows the current, evidence-based guidelines published by the [facility] ([facility]) when recommending preventive services for our patients. Because we follow these guidelines, sometimes recommendations change over time as research supports it. (For example, mammograms used to be recommended annually. Even though Medicare will still pay for an annual mammogram, the newer guidelines recommend a mammogram every two years for women of average risk). Of course, you and your doctor may decide to screen more often for some diseases, based on your risk and your co-morbidities (chronic disease you are already diagnosed with). Preventive services for you include:  - Medicare offers their members a free annual wellness visit, which is time for you and your primary care provider to discuss and plan for your preventive service needs. Take advantage of this benefit every year!  -All adults over the age of 72 should receive the recommended pneumonia vaccines. Current USPSTF guidelines recommend a series of two vaccines for the best pneumonia protection.   -All adults should have a flu vaccine yearly and a tetanus vaccine every 10 years.   -All adults age 48 and older should receive the shingles vaccines (series of two vaccines).       -All adults age 38-68 who are overweight should have a diabetes screening test once every three years.   -All adults born between [birth year] and 1965 should be screened once for Hepatitis C.  -Other screening tests and preventive services for persons with diabetes include: an eye exam to screen for diabetic retinopathy, a kidney function test, a foot exam, and stricter control over your cholesterol.   -Cardiovascular screening for adults with routine risk involves an electrocardiogram (ECG) at intervals determined by your doctor.   -Colorectal cancer screenings should be done for adults age 54-65 with no increased risk factors for colorectal cancer. There are a number of acceptable methods of screening for this type of cancer. Each test has its own benefits and drawbacks. Discuss with your doctor what is most appropriate for you during your annual wellness visit. The different tests include: colonoscopy (considered the best screening method), a fecal occult blood test, a fecal DNA test, and sigmoidoscopy.    -A bone mass density test is recommended when a woman turns 65 to screen for osteoporosis. This test is only recommended one time, as a screening. Some providers will use this same test as a disease monitoring tool if you already have osteoporosis. -Breast cancer screenings are recommended every other year for women of normal risk, age 54-69.  -Cervical cancer screenings for women over age 72 are only recommended with certain risk factors.      Here is a list of your current Health Maintenance items (your personalized list of preventive services) with a due date:      Health Maintenance Due   Topic Date Due    COVID-19 Vaccine (1) Never done    Shingrix Vaccine Age 50> (1 of 2) Never done    Breast Cancer Screen Mammogram  10/24/2020    DTaP/Tdap/Td series (2 - Td or Tdap) August 2023    Depression Monitoring  Completed today    Medicare Yearly Exam  07/11/2023

## 2022-07-22 LAB
ALBUMIN SERPL-MCNC: 4.3 G/DL (ref 3.7–4.7)
ALBUMIN/GLOB SERPL: 1.9 {RATIO} (ref 1.2–2.2)
ALP SERPL-CCNC: 69 IU/L (ref 44–121)
ALT SERPL-CCNC: 36 IU/L (ref 0–32)
AST SERPL-CCNC: 28 IU/L (ref 0–40)
BILIRUB SERPL-MCNC: 0.7 MG/DL (ref 0–1.2)
BUN SERPL-MCNC: 22 MG/DL (ref 8–27)
BUN/CREAT SERPL: 28 (ref 12–28)
CALCIUM SERPL-MCNC: 8.9 MG/DL (ref 8.7–10.3)
CHLORIDE SERPL-SCNC: 102 MMOL/L (ref 96–106)
CHOLEST SERPL-MCNC: 210 MG/DL (ref 100–199)
CO2 SERPL-SCNC: 26 MMOL/L (ref 20–29)
CREAT SERPL-MCNC: 0.78 MG/DL (ref 0.57–1)
EGFR: 80 ML/MIN/1.73
GLOBULIN SER CALC-MCNC: 2.3 G/DL (ref 1.5–4.5)
GLUCOSE SERPL-MCNC: 85 MG/DL (ref 65–99)
HDLC SERPL-MCNC: 85 MG/DL
IMP & REVIEW OF LAB RESULTS: NORMAL
LDLC SERPL CALC-MCNC: 107 MG/DL (ref 0–99)
POTASSIUM SERPL-SCNC: 4.4 MMOL/L (ref 3.5–5.2)
PROT SERPL-MCNC: 6.6 G/DL (ref 6–8.5)
SODIUM SERPL-SCNC: 140 MMOL/L (ref 134–144)
TRIGL SERPL-MCNC: 101 MG/DL (ref 0–149)
VLDLC SERPL CALC-MCNC: 18 MG/DL (ref 5–40)

## 2022-08-31 ENCOUNTER — TELEPHONE (OUTPATIENT)
Dept: INTERNAL MEDICINE CLINIC | Age: 77
End: 2022-08-31

## 2022-08-31 RX ORDER — SULFAMETHOXAZOLE AND TRIMETHOPRIM 800; 160 MG/1; MG/1
1 TABLET ORAL 2 TIMES DAILY
Qty: 30 TABLET | Refills: 0 | Status: SHIPPED | OUTPATIENT
Start: 2022-08-31

## 2022-08-31 NOTE — TELEPHONE ENCOUNTER
----- Message from Varinder Gray sent at 8/31/2022  1:27 PM EDT -----  Regarding: FW: Jena March DS    ----- Message -----  From: Aura Oneal  Sent: 8/31/2022  11:10 AM EDT  To: Love Angel Pool  Subject: Tamiko Morrow,  You have been kind enough in the past to give me a prescription for Bactrim DS #30 to keep on hand since I have trouble controlling my bowels. I am having a lot of pressure and burning and urgency. I would like a refill to the [facility] on record in my chart . I havent had too much trouble. I think the last refill was in March of 2020. I never take it unless I absolutely have to. Thank you for your help. Ill see you again in January.     Margot Martínez

## 2023-01-15 PROBLEM — F33.1 MODERATE EPISODE OF RECURRENT MAJOR DEPRESSIVE DISORDER (HCC): Status: ACTIVE | Noted: 2023-01-15

## 2023-01-15 NOTE — PROGRESS NOTES
Subjective: Ángela Min is a 78 y.o. female who is seen for follow up of hyperlipidemia and depression. Cardiovascular risk analysis - 78 y.o. female LDL goal is under 130. Compliance with treatment thus far has been good. ROS: taking medications as instructed, no medication side effects noted, no TIA's, no chest pain on exertion, no dyspnea on exertion, noting swelling of ankles. New concerns:     Right TKR with Dr. Kit Marcelino on 2/24/23. Had LTKR 10/2021. Hoping to go back to work after 2 weeks. Saw Dr. Edy Altamirano already this am.    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 Started sweating and couldn't stop. Lasted for hours. No nausea, CP, neck pain, arm pain or sob. Had nml meals of protein shake and rice cakes. Drove herself home and ate something - felt better. Just had all her teeth pulled. Has temporary denture which is very ill-fitting. Worked 13 days without a break, eating limited as above. Has told boss she needed to go back to her regular 3 days per week. Feeling better. Legs had been swelling took Lasix but > 11years old. Current Outpatient Medications   Medication Sig Dispense Refill    COLLAGEN by Does Not Apply route daily. loratadine (Claritin) 10 mg tablet Take 10 mg by mouth. loperamide HCl (IMODIUM PO) Take  by mouth. furosemide (LASIX) 40 mg tablet TAKE ONE-HALF TO ONE TABLET BY MOUTH ONCE DAILY AS NEEDED (SWELLING) 90 Tablet 1    meloxicam (MOBIC) 15 mg tablet TAKE 1 TABLET DAILY 90 Tablet 1    ALPRAZolam (Xanax) 0.5 mg tablet Take 0.5-1 Tablets by mouth two (2) times daily as needed for Anxiety. Max Daily Amount: 1 mg. 90 Tablet 0    trimethoprim-sulfamethoxazole (BACTRIM DS, SEPTRA DS) 160-800 mg per tablet Take 1 Tablet by mouth two (2) times a day. X 5 days prn uti 30 Tablet 0    atorvastatin (LIPITOR) 20 mg tablet Take 1 Tablet by mouth daily.  90 Tablet 3    estradioL (ESTRACE) 1 mg tablet TAKE 1 TABLET DAILY 90 Tablet 3 magnesium 250 mg tab Take 250 mg by mouth daily. cholecalciferol (VITAMIN D3) (5000 Units /125 mcg) capsule Take 10,000 Units by mouth. zinc acetate 50 mg (zinc) cap Take 50 mg by mouth daily. MULTIVITS-MIN/IRON/FA/LUTEIN (CENTRUM SILVER WOMEN PO) Take 1 Tab by mouth daily. varicella-zoster recombinant, PF, (Shingrix, PF,) 50 mcg/0.5 mL susr injection 0.5mL by IntraMUSCular route once now and then repeat in 2-6 months (Patient not taking: Reported on 1/16/2023) 0.5 mL 1      Lab Results   Component Value Date/Time    Hemoglobin A1c 5.9 08/14/2013 10:57 AM    Glucose 85 07/21/2022 10:23 AM    Glucose (POC) 95 09/03/2013 11:38 AM    LDL, calculated 107 (H) 07/21/2022 10:23 AM    LDL, calculated 99.8 03/16/2020 11:36 AM    Creatinine 0.78 07/21/2022 10:23 AM      Lab Results   Component Value Date/Time    Cholesterol, total 210 (H) 07/21/2022 10:23 AM    HDL Cholesterol 85 07/21/2022 10:23 AM    LDL, calculated 107 (H) 07/21/2022 10:23 AM    LDL, calculated 99.8 03/16/2020 11:36 AM    Triglyceride 101 07/21/2022 10:23 AM    CHOL/HDL Ratio 2.9 03/16/2020 11:36 AM     Lab Results   Component Value Date/Time    ALT (SGPT) 36 (H) 07/21/2022 10:23 AM    Alk.  phosphatase 69 07/21/2022 10:23 AM    Bilirubin, direct 0.19 06/16/2021 04:16 PM    Bilirubin, total 0.7 07/21/2022 10:23 AM    Albumin 4.3 07/21/2022 10:23 AM    Protein, total 6.6 07/21/2022 10:23 AM    INR 1.0 08/14/2013 10:57 AM    Prothrombin time 10.3 08/14/2013 10:57 AM    PLATELET 512 45/63/8751 09:41 AM    Hepatitis B surface Ag 0.12 08/28/2013 03:00 PM       Lab Results   Component Value Date/Time    GFR est non-AA 74 01/27/2022 09:41 AM    GFR est AA 85 01/27/2022 09:41 AM    Creatinine 0.78 07/21/2022 10:23 AM    BUN 22 07/21/2022 10:23 AM    Sodium 140 07/21/2022 10:23 AM    Potassium 4.4 07/21/2022 10:23 AM    Chloride 102 07/21/2022 10:23 AM    CO2 26 07/21/2022 10:23 AM    Magnesium 1.8 09/10/2013 04:00 AM    Phosphorus 2.6 07/07/2011 03:20 PM        Objective:   Visit Vitals  /78   Pulse 77   Temp 98.2 °F (36.8 °C) (Temporal)   Resp 16   Ht 5' 1\" (1.549 m)   Wt 174 lb 12.8 oz (79.3 kg)   SpO2 98%   BMI 33.03 kg/m²      Appearance: alert, well appearing, and in no distress and oriented to person, place, and time. CVS exam   NECK: supple, no lad, no bruit, no tm  LUNGS: cta bilat  CV rrr, no m/g/r  ABD: soft, nt, nd, nabs  EXT: no c/c, trace bilat le edema. Assessment/Plan:     Diagnoses and all orders for this visit:    1. Pure hypercholesterolemia - well controlled, cont Atorvastatin. Needs to return next month for preop for TKR. Will have cholesterol labs drawn at that time. 2. Moderate episode of recurrent major depressive disorder ([facility] [address] - controlled, continue Xanax prn. 3. Primary osteoarthritis of right knee upcoming TKR. -     meloxicam (MOBIC) 15 mg tablet; TAKE 1 TABLET DAILY    4. Leg edema - intermittent. Renewed lasix to take prn. Elevate legs, decrease salt intake, increase water intake. -     furosemide (LASIX) 40 mg tablet; TAKE ONE-HALF TO ONE TABLET BY MOUTH ONCE DAILY AS NEEDED (SWELLING)    5, episode of sweating - ? Low blood sugar. Encouraged to keep snack on hand. If recurs encouraged ER visit. Follow-up and Dispositions    Return in about 4 weeks (around 2/13/2023) for R TKR preop. Chris Cardenas

## 2023-01-16 ENCOUNTER — OFFICE VISIT (OUTPATIENT)
Dept: INTERNAL MEDICINE CLINIC | Age: 78
End: 2023-01-16
Payer: MEDICARE

## 2023-01-16 VITALS
WEIGHT: 174.8 LBS | BODY MASS INDEX: 33 KG/M2 | TEMPERATURE: 98.2 F | OXYGEN SATURATION: 98 % | RESPIRATION RATE: 16 BRPM | DIASTOLIC BLOOD PRESSURE: 78 MMHG | SYSTOLIC BLOOD PRESSURE: 132 MMHG | HEART RATE: 77 BPM | HEIGHT: 61 IN

## 2023-01-16 DIAGNOSIS — R60.0 LEG EDEMA: ICD-10-CM

## 2023-01-16 DIAGNOSIS — M17.11 PRIMARY OSTEOARTHRITIS OF RIGHT KNEE: ICD-10-CM

## 2023-01-16 DIAGNOSIS — E78.00 PURE HYPERCHOLESTEROLEMIA: Primary | ICD-10-CM

## 2023-01-16 DIAGNOSIS — F33.1 MODERATE EPISODE OF RECURRENT MAJOR DEPRESSIVE DISORDER (HCC): ICD-10-CM

## 2023-01-16 PROCEDURE — G8399 PT W/DXA RESULTS DOCUMENT: HCPCS | Performed by: INTERNAL MEDICINE

## 2023-01-16 PROCEDURE — 99214 OFFICE O/P EST MOD 30 MIN: CPT | Performed by: INTERNAL MEDICINE

## 2023-01-16 PROCEDURE — G8427 DOCREV CUR MEDS BY ELIG CLIN: HCPCS | Performed by: INTERNAL MEDICINE

## 2023-01-16 PROCEDURE — 1101F PT FALLS ASSESS-DOCD LE1/YR: CPT | Performed by: INTERNAL MEDICINE

## 2023-01-16 PROCEDURE — G9717 DOC PT DX DEP/BP F/U NT REQ: HCPCS | Performed by: INTERNAL MEDICINE

## 2023-01-16 PROCEDURE — G8536 NO DOC ELDER MAL SCRN: HCPCS | Performed by: INTERNAL MEDICINE

## 2023-01-16 PROCEDURE — G0463 HOSPITAL OUTPT CLINIC VISIT: HCPCS | Performed by: INTERNAL MEDICINE

## 2023-01-16 PROCEDURE — 1090F PRES/ABSN URINE INCON ASSESS: CPT | Performed by: INTERNAL MEDICINE

## 2023-01-16 PROCEDURE — G8417 CALC BMI ABV UP PARAM F/U: HCPCS | Performed by: INTERNAL MEDICINE

## 2023-01-16 PROCEDURE — 3017F COLORECTAL CA SCREEN DOC REV: CPT | Performed by: INTERNAL MEDICINE

## 2023-01-16 RX ORDER — ALPRAZOLAM 0.5 MG/1
.25-.5 TABLET ORAL
Qty: 90 TABLET | Refills: 0 | Status: SHIPPED | OUTPATIENT
Start: 2023-01-16 | End: 2023-01-16 | Stop reason: SDUPTHER

## 2023-01-16 RX ORDER — LORATADINE 10 MG/1
10 TABLET ORAL
COMMUNITY

## 2023-01-16 RX ORDER — FUROSEMIDE 40 MG/1
TABLET ORAL
Qty: 90 TABLET | Refills: 1 | Status: SHIPPED | OUTPATIENT
Start: 2023-01-16

## 2023-01-16 RX ORDER — MELOXICAM 15 MG/1
TABLET ORAL
Qty: 90 TABLET | Refills: 1 | Status: SHIPPED | OUTPATIENT
Start: 2023-01-16

## 2023-01-16 RX ORDER — ALPRAZOLAM 0.5 MG/1
.25-.5 TABLET ORAL
Qty: 90 TABLET | Refills: 0 | Status: SHIPPED | OUTPATIENT
Start: 2023-01-16

## 2023-01-16 NOTE — PROGRESS NOTES
Room 5     Identified pt with two pt identifiers(name and ). Reviewed record in preparation for visit and have obtained necessary documentation. All patient medications has been reviewed. Chief Complaint   Patient presents with    Follow-up     6 month f/u;         3 most recent PHQ Screens 2022   PHQ Not Done -   Little interest or pleasure in doing things Not at all   Feeling down, depressed, irritable, or hopeless Not at all   Total Score PHQ 2 0     Abuse Screening Questionnaire 2022   Do you ever feel afraid of your partner? N   Are you in a relationship with someone who physically or mentally threatens you? N   Is it safe for you to go home? Y       Health Maintenance Due   Topic    COVID-19 Vaccine (1)    Shingles Vaccine (1 of 2)    DTaP/Tdap/Td series (2 - Td or Tdap)    Flu Vaccine (1)         Health Maintenance Review: Patient reminded of \"due or due soon\" health maintenance. I have asked the patient to contact his/her primary care provider (PCP) for follow-up on his/her health maintenance. Vitals:    23 0833   Height: 5' 1\" (1.549 m)       Wt Readings from Last 3 Encounters:   22 174 lb (78.9 kg)   01/10/22 160 lb 6.4 oz (72.8 kg)   21 169 lb (76.7 kg)     Temp Readings from Last 3 Encounters:   22 98 °F (36.7 °C) (Oral)   01/10/22 98.3 °F (36.8 °C) (Temporal)   21 98.2 °F (36.8 °C) (Temporal)     BP Readings from Last 3 Encounters:   22 (!) 153/79   01/10/22 134/76   21 134/70     Pulse Readings from Last 3 Encounters:   22 73   01/10/22 78   21 73       Coordination of Care Questionnaire:   1) Have you been to an emergency room, urgent care, or hospitalized since your last visit?   no       2. Have seen or consulted any other health care provider since your last visit?   DR Connell [address] \    Patient is accompanied by self I have received verbal consent from Halie Costa to discuss any/all medical information while they are present in the room.

## 2023-02-06 ENCOUNTER — TELEPHONE (OUTPATIENT)
Dept: INTERNAL MEDICINE CLINIC | Age: 78
End: 2023-02-06

## 2023-02-06 NOTE — TELEPHONE ENCOUNTER
----- Message from Kady Solis sent at 2/3/2023  2:52 PM EST -----  Subject: Message to Provider    QUESTIONS  Information for Provider? pt. needs to do a fasting cholestorol test. on   the 2/16/23  ---------------------------------------------------------------------------  --------------  Yasmine Cardona INFO  [phone]; OK to leave message on voicemail  ---------------------------------------------------------------------------  --------------  SCRIPT ANSWERS  Relationship to Patient?  Self

## 2023-02-13 ENCOUNTER — TELEPHONE (OUTPATIENT)
Dept: INTERNAL MEDICINE CLINIC | Age: 78
End: 2023-02-13

## 2023-02-13 DIAGNOSIS — E78.00 PURE HYPERCHOLESTEROLEMIA: Primary | ICD-10-CM

## 2023-02-13 NOTE — TELEPHONE ENCOUNTER
----- Message from Gema Friend LPN sent at 3/76/3556 10:03 AM EST -----  Regarding: FW: Fasting cholesterol test    ----- Message -----  From: Jacob Jeffries  Sent: 2/13/2023   9:16 AM EST  To: [facility]  Subject: Fasting cholesterol test                         I had requested an order for my fasting cholesterol blood test, but havent received it or notification that it has been ordered through [address] [place] [address]. I am coming to [place] this Thursday the 16th. I would like to go to [facility] that day. Please advise if the order has been sent.     Thank you,    Cori [place]

## 2023-02-17 LAB
CHOLEST SERPL-MCNC: 155 MG/DL
HDLC SERPL-MCNC: 76 MG/DL
HDLC SERPL: 2 (ref 0–5)
LDLC SERPL CALC-MCNC: 66.2 MG/DL (ref 0–100)
TRIGL SERPL-MCNC: 64 MG/DL (ref ?–150)
VLDLC SERPL CALC-MCNC: 12.8 MG/DL

## 2023-04-21 DIAGNOSIS — Z12.31 SCREENING MAMMOGRAM FOR HIGH-RISK PATIENT: Primary | ICD-10-CM

## 2023-05-15 RX ORDER — ALPRAZOLAM 0.5 MG/1
TABLET ORAL
Qty: 90 TABLET | Refills: 0 | OUTPATIENT
Start: 2023-05-15

## 2023-07-09 SDOH — ECONOMIC STABILITY: HOUSING INSECURITY
IN THE LAST 12 MONTHS, WAS THERE A TIME WHEN YOU DID NOT HAVE A STEADY PLACE TO SLEEP OR SLEPT IN A SHELTER (INCLUDING NOW)?: NO

## 2023-07-09 SDOH — ECONOMIC STABILITY: INCOME INSECURITY: HOW HARD IS IT FOR YOU TO PAY FOR THE VERY BASICS LIKE FOOD, HOUSING, MEDICAL CARE, AND HEATING?: NOT HARD AT ALL

## 2023-07-09 SDOH — ECONOMIC STABILITY: TRANSPORTATION INSECURITY
IN THE PAST 12 MONTHS, HAS LACK OF TRANSPORTATION KEPT YOU FROM MEETINGS, WORK, OR FROM GETTING THINGS NEEDED FOR DAILY LIVING?: NO

## 2023-07-09 SDOH — ECONOMIC STABILITY: FOOD INSECURITY: WITHIN THE PAST 12 MONTHS, THE FOOD YOU BOUGHT JUST DIDN'T LAST AND YOU DIDN'T HAVE MONEY TO GET MORE.: NEVER TRUE

## 2023-07-09 SDOH — ECONOMIC STABILITY: FOOD INSECURITY: WITHIN THE PAST 12 MONTHS, YOU WORRIED THAT YOUR FOOD WOULD RUN OUT BEFORE YOU GOT MONEY TO BUY MORE.: NEVER TRUE

## 2023-07-12 ENCOUNTER — OFFICE VISIT (OUTPATIENT)
Age: 78
End: 2023-07-12
Payer: MEDICARE

## 2023-07-12 VITALS
BODY MASS INDEX: 29.27 KG/M2 | HEIGHT: 61 IN | DIASTOLIC BLOOD PRESSURE: 75 MMHG | HEART RATE: 93 BPM | RESPIRATION RATE: 14 BRPM | WEIGHT: 155 LBS | OXYGEN SATURATION: 98 % | SYSTOLIC BLOOD PRESSURE: 115 MMHG | TEMPERATURE: 98.5 F

## 2023-07-12 DIAGNOSIS — F33.1 MAJOR DEPRESSIVE DISORDER, RECURRENT, MODERATE (HCC): ICD-10-CM

## 2023-07-12 DIAGNOSIS — E78.00 PURE HYPERCHOLESTEROLEMIA, UNSPECIFIED: Primary | ICD-10-CM

## 2023-07-12 DIAGNOSIS — E55.9 VITAMIN D DEFICIENCY, UNSPECIFIED: ICD-10-CM

## 2023-07-12 DIAGNOSIS — R73.9 ELEVATED BLOOD SUGAR: ICD-10-CM

## 2023-07-12 DIAGNOSIS — E78.00 PURE HYPERCHOLESTEROLEMIA, UNSPECIFIED: ICD-10-CM

## 2023-07-12 PROCEDURE — G8399 PT W/DXA RESULTS DOCUMENT: HCPCS | Performed by: INTERNAL MEDICINE

## 2023-07-12 PROCEDURE — 1090F PRES/ABSN URINE INCON ASSESS: CPT | Performed by: INTERNAL MEDICINE

## 2023-07-12 PROCEDURE — 99214 OFFICE O/P EST MOD 30 MIN: CPT | Performed by: INTERNAL MEDICINE

## 2023-07-12 PROCEDURE — G8419 CALC BMI OUT NRM PARAM NOF/U: HCPCS | Performed by: INTERNAL MEDICINE

## 2023-07-12 PROCEDURE — 3017F COLORECTAL CA SCREEN DOC REV: CPT | Performed by: INTERNAL MEDICINE

## 2023-07-12 PROCEDURE — 1123F ACP DISCUSS/DSCN MKR DOCD: CPT | Performed by: INTERNAL MEDICINE

## 2023-07-12 PROCEDURE — 1036F TOBACCO NON-USER: CPT | Performed by: INTERNAL MEDICINE

## 2023-07-12 PROCEDURE — G8427 DOCREV CUR MEDS BY ELIG CLIN: HCPCS | Performed by: INTERNAL MEDICINE

## 2023-07-12 RX ORDER — MAGNESIUM GLUCONATE 27 MG(500)
500 TABLET ORAL DAILY
COMMUNITY

## 2023-07-12 NOTE — PROGRESS NOTES
Subjective: Dimitri Hawk is a 78 y.o. female who is seen for follow up of hyperlipidemia. Cardiovascular risk analysis - 78 y.o. female LDL goal is under 130. Compliance with treatment thus far has been good. ROS: taking medications as instructed, no medication side effects noted, no TIA's, no chest pain on exertion, no dyspnea on exertion, no swelling of ankles, no orthostatic dizziness or lightheadedness, no palpitations. New concerns:   Depression - some increased stress recently. Brother  suddenly in March at age of 78. Recently had right TKR . A1C was 6.0% walking well without cane. Has lost 26 lbs - consciously cut back on food  Pain in fingers and hands. Meloxicam not controlling her pain. Continues to type at work. Current Outpatient Medications   Medication Sig Dispense Refill    magnesium gluconate (MAGONATE) 500 MG tablet Take 1 tablet by mouth daily      ALPRAZolam (XANAX) 0.5 MG tablet Take 0.5-1 tablets by mouth 2 times daily as needed. atorvastatin (LIPITOR) 20 MG tablet Take 1 tablet by mouth daily      vitamin D (CHOLECALCIFEROL) 125 MCG (5000 UT) CAPS capsule Take 2 capsules by mouth      estradiol (ESTRACE) 1 MG tablet TAKE 1 TABLET DAILY      furosemide (LASIX) 40 MG tablet TAKE ONE-HALF TO ONE TABLET BY MOUTH ONCE DAILY AS NEEDED (SWELLING)      loratadine (CLARITIN) 10 MG tablet Take 1 tablet by mouth      meloxicam (MOBIC) 15 MG tablet TAKE 1 TABLET DAILY      Zinc Acetate (GALZIN) 50 MG capsule Take 1 capsule by mouth daily       No current facility-administered medications for this visit.         Lab Results   Component Value Date    CHOL 155 2023    CHOL 210 (H) 2022    CHOL 188 2022     Lab Results   Component Value Date    TRIG 64 2023    TRIG 101 2022    TRIG 97 2022     Lab Results   Component Value Date    HDL 76 2023    HDL 85 2022    HDL 70 2022     Lab Results   Component Value Date

## 2023-07-29 LAB
25(OH)D3+25(OH)D2 SERPL-MCNC: 71.8 NG/ML (ref 30–100)
ALBUMIN SERPL-MCNC: 3.9 G/DL (ref 3.8–4.8)
ALBUMIN/GLOB SERPL: 1.6 {RATIO} (ref 1.2–2.2)
ALP SERPL-CCNC: 69 IU/L (ref 44–121)
ALT SERPL-CCNC: 24 IU/L (ref 0–32)
AST SERPL-CCNC: 32 IU/L (ref 0–40)
BILIRUB SERPL-MCNC: 0.6 MG/DL (ref 0–1.2)
BUN SERPL-MCNC: 23 MG/DL (ref 8–27)
BUN/CREAT SERPL: 40 (ref 12–28)
CALCIUM SERPL-MCNC: 9 MG/DL (ref 8.7–10.3)
CHLORIDE SERPL-SCNC: 105 MMOL/L (ref 96–106)
CHOLEST SERPL-MCNC: 167 MG/DL (ref 100–199)
CO2 SERPL-SCNC: 25 MMOL/L (ref 20–29)
CREAT SERPL-MCNC: 0.58 MG/DL (ref 0.57–1)
EGFRCR SERPLBLD CKD-EPI 2021: 94 ML/MIN/1.73
GLOBULIN SER CALC-MCNC: 2.4 G/DL (ref 1.5–4.5)
GLUCOSE SERPL-MCNC: 79 MG/DL (ref 70–99)
HBA1C MFR BLD: 5.7 % (ref 4.8–5.6)
HDLC SERPL-MCNC: 70 MG/DL
IMP & REVIEW OF LAB RESULTS: NORMAL
LDLC SERPL CALC-MCNC: 83 MG/DL (ref 0–99)
POTASSIUM SERPL-SCNC: 4 MMOL/L (ref 3.5–5.2)
PROT SERPL-MCNC: 6.3 G/DL (ref 6–8.5)
SODIUM SERPL-SCNC: 143 MMOL/L (ref 134–144)
TRIGL SERPL-MCNC: 77 MG/DL (ref 0–149)
VLDLC SERPL CALC-MCNC: 14 MG/DL (ref 5–40)

## 2023-08-17 RX ORDER — MELOXICAM 15 MG/1
TABLET ORAL
Qty: 90 TABLET | Refills: 1 | Status: SHIPPED | OUTPATIENT
Start: 2023-08-17

## 2023-08-17 RX ORDER — ATORVASTATIN CALCIUM 20 MG/1
TABLET, FILM COATED ORAL
Qty: 90 TABLET | Refills: 3 | Status: SHIPPED | OUTPATIENT
Start: 2023-08-17

## 2023-08-18 RX ORDER — ESTRADIOL 1 MG/1
TABLET ORAL
Qty: 90 TABLET | Refills: 3 | Status: SHIPPED | OUTPATIENT
Start: 2023-08-18

## 2023-09-01 ENCOUNTER — TELEPHONE (OUTPATIENT)
Age: 78
End: 2023-09-01

## 2023-09-01 DIAGNOSIS — N64.59 OTHER SIGNS AND SYMPTOMS IN BREAST: ICD-10-CM

## 2023-09-01 DIAGNOSIS — N63.20 MASS OF LEFT BREAST, UNSPECIFIED QUADRANT: Primary | ICD-10-CM

## 2023-09-01 NOTE — TELEPHONE ENCOUNTER
----- Message from [facility]. Guillermo Mitchell sent at 9/1/2023 10:18 AM EDT -----  Regarding: Diagnostic mammogram  Contact: [phone]  Would you please send an order for a diagnostic mammogram to [facility]? I feel a mass in my left breast. Thank you!

## 2023-09-28 ENCOUNTER — TELEPHONE (OUTPATIENT)
Age: 78
End: 2023-09-28

## 2023-09-28 ENCOUNTER — HOSPITAL ENCOUNTER (OUTPATIENT)
Facility: HOSPITAL | Age: 78
End: 2023-09-28
Attending: INTERNAL MEDICINE
Payer: MEDICARE

## 2023-09-28 ENCOUNTER — HOSPITAL ENCOUNTER (OUTPATIENT)
Facility: HOSPITAL | Age: 78
Discharge: HOME | End: 2023-09-28
Attending: INTERNAL MEDICINE
Payer: MEDICARE

## 2023-09-28 DIAGNOSIS — N63.20 MASS OF LEFT BREAST, UNSPECIFIED QUADRANT: ICD-10-CM

## 2023-09-28 DIAGNOSIS — N64.59 OTHER SIGNS AND SYMPTOMS IN BREAST: ICD-10-CM

## 2023-09-28 PROCEDURE — 76642 ULTRASOUND BREAST LIMITED: CPT

## 2023-09-28 PROCEDURE — G0279 TOMOSYNTHESIS, MAMMO: HCPCS

## 2023-09-28 NOTE — TELEPHONE ENCOUNTER
Anthony [phone]. Needs a call back from provider or nurse to discuss a procedure for the patient.

## 2023-09-29 NOTE — TELEPHONE ENCOUNTER
Needs biopsy but going to have completed in [place]. Was not scheduling with [facility].

## 2023-09-29 NOTE — PROGRESS NOTES
I spoke with Maryjane French on 9/28/23 re:  breast biopsy recommendation. She sent message to the nurse to return my call. Dr. Loraine Fierro called and spoke with Vianney Less about biopsy recommendation and that the patient was going to schedule this in [place] since it was closer to her home.

## 2023-10-04 ENCOUNTER — TELEPHONE (OUTPATIENT)
Age: 78
End: 2023-10-04

## 2023-10-04 DIAGNOSIS — N63.20 MASS OF LEFT BREAST, UNSPECIFIED QUADRANT: Primary | ICD-10-CM

## 2023-10-27 ENCOUNTER — TELEPHONE (OUTPATIENT)
Age: 78
End: 2023-10-27

## 2023-10-27 NOTE — TELEPHONE ENCOUNTER
Cindy Arthur with [facility] in [place] calling to get an order for a breast biopsy.  Patient is at the facility waiting to be seen but the order is needed first. She says she will fax over the request.

## 2023-10-28 ENCOUNTER — TELEPHONE (OUTPATIENT)
Age: 78
End: 2023-10-28

## 2023-10-28 RX ORDER — SULFAMETHOXAZOLE AND TRIMETHOPRIM 800; 160 MG/1; MG/1
1 TABLET ORAL 2 TIMES DAILY
Qty: 30 TABLET | Refills: 0 | Status: SHIPPED | OUTPATIENT
Start: 2023-10-28

## 2023-10-28 NOTE — TELEPHONE ENCOUNTER
Regarding: Abnormal mammogram  Contact: [phone]  Thank you for all your help!! I am very disappointed to hear about your leaving! Are you retiring or going to a different practice? I would like to continue with you if at all possible!!  In any case, I am in need of my Bactrim DS. I feel another infection coming on with burning and urgency. You have sent 30 tabs in the past to keep on hand. I would appreciate your help. Thank you!   Sundeep Quiroz

## 2023-10-30 ENCOUNTER — TELEPHONE (OUTPATIENT)
Age: 78
End: 2023-10-30

## 2023-11-14 ENCOUNTER — TELEPHONE (OUTPATIENT)
Age: 78
End: 2023-11-14

## 2023-11-14 NOTE — TELEPHONE ENCOUNTER
Called pt to follow up. She stated she went to another provider office and had completed a medical records paperwork to be faxed.          \"Rafaela, [address] [facility] Desk Staff  Subject: Message to Provider     QUESTIONS   Information for Provider? needs mri paperwork filled out \"

## 2024-01-18 ENCOUNTER — TELEPHONE (OUTPATIENT)
Age: 79
End: 2024-01-18

## 2024-01-18 NOTE — TELEPHONE ENCOUNTER
Called patient and spoke to patient. She stated that she is no longer living in [place] and stated that she have an PCP.     She asked to be removed from the list.